# Patient Record
Sex: MALE | Race: WHITE | Employment: FULL TIME | URBAN - METROPOLITAN AREA
[De-identification: names, ages, dates, MRNs, and addresses within clinical notes are randomized per-mention and may not be internally consistent; named-entity substitution may affect disease eponyms.]

---

## 2018-11-08 ENCOUNTER — OFFICE VISIT (OUTPATIENT)
Dept: INTERNAL MEDICINE CLINIC | Facility: CLINIC | Age: 38
End: 2018-11-08
Payer: COMMERCIAL

## 2018-11-08 VITALS
SYSTOLIC BLOOD PRESSURE: 124 MMHG | BODY MASS INDEX: 25.28 KG/M2 | WEIGHT: 190.75 LBS | OXYGEN SATURATION: 99 % | TEMPERATURE: 98 F | DIASTOLIC BLOOD PRESSURE: 78 MMHG | HEART RATE: 76 BPM | HEIGHT: 72.75 IN | RESPIRATION RATE: 16 BRPM

## 2018-11-08 DIAGNOSIS — Z00.00 ROUTINE GENERAL MEDICAL EXAMINATION AT A HEALTH CARE FACILITY: Primary | ICD-10-CM

## 2018-11-08 PROCEDURE — 99385 PREV VISIT NEW AGE 18-39: CPT | Performed by: INTERNAL MEDICINE

## 2018-11-08 NOTE — PROGRESS NOTES
Ramon Villegas  1980 is a 43year old male. Patient presents with:  Establish Care      HPI:    see below     No current outpatient medications on file. History reviewed. No pertinent past medical history.    Social History:  Social History frequency , urinary incontinence/no history of kidney disease or genital abnormalities. no Dysuria. Nocturia None. Musculoskeletal:   Patient denies arthritis , back pain, muscle weakness . Joint pain none. Joint stiffness none.    Peripheral Vascular: Pulses: present. Tremors: no.   Varicose veins: absent . MUSCULOSKELETAL:   Cervical spines: normal.   L-S spines: normal.   Lower extremity joints: normal.   Upper extremity joints: normal.   NEUROLOGICAL:   Babinski: negative. All other reflexes are

## 2019-08-08 ENCOUNTER — OFFICE VISIT (OUTPATIENT)
Dept: INTERNAL MEDICINE CLINIC | Facility: CLINIC | Age: 39
End: 2019-08-08
Payer: COMMERCIAL

## 2019-08-08 VITALS
SYSTOLIC BLOOD PRESSURE: 108 MMHG | WEIGHT: 199.5 LBS | DIASTOLIC BLOOD PRESSURE: 66 MMHG | RESPIRATION RATE: 16 BRPM | HEIGHT: 72.5 IN | HEART RATE: 80 BPM | BODY MASS INDEX: 26.73 KG/M2 | TEMPERATURE: 98 F

## 2019-08-08 DIAGNOSIS — K42.9 UMBILICAL HERNIA WITHOUT OBSTRUCTION AND WITHOUT GANGRENE: Primary | ICD-10-CM

## 2019-08-08 PROCEDURE — 99213 OFFICE O/P EST LOW 20 MIN: CPT | Performed by: INTERNAL MEDICINE

## 2019-08-08 NOTE — PATIENT INSTRUCTIONS
- Follow up with General Surgery in their clinic for evaluation of your hernia. Dr. Valentín Linda is here in this office on Tuesdays, and Dr. Dameon Chopra is here on Tuesdays. They are in [facility] and [place] the rest of the week.   Scheduling phone number is [phone]

## 2019-08-08 NOTE — PROGRESS NOTES
Siddhartha Chung is a 35year old male. HPI:   Patient presents with:  Lump: Pt c/o lump to his belly button area. Noticed a few months ago. No pain. Patient presents with a bump on umbilical area. There for a few months. Painless.   No change in b affect  ASSESSMENT AND PLAN:   1. Umbilical hernia without obstruction and without gangrene  Reducible hernia in umbilical area. Present for past several months. New problem. No constitutional symptoms or signs/symptoms of bowel obstruction.   Patient re

## 2019-08-11 PROBLEM — K42.9 UMBILICAL HERNIA WITHOUT OBSTRUCTION AND WITHOUT GANGRENE: Status: ACTIVE | Noted: 2019-08-11

## 2019-09-03 ENCOUNTER — OFFICE VISIT (OUTPATIENT)
Dept: SURGERY | Facility: CLINIC | Age: 39
End: 2019-09-03
Payer: COMMERCIAL

## 2019-09-03 VITALS
SYSTOLIC BLOOD PRESSURE: 125 MMHG | TEMPERATURE: 99 F | HEIGHT: 72.5 IN | BODY MASS INDEX: 26.66 KG/M2 | HEART RATE: 80 BPM | WEIGHT: 199 LBS | DIASTOLIC BLOOD PRESSURE: 84 MMHG

## 2019-09-03 DIAGNOSIS — R10.31 RIGHT GROIN PAIN: ICD-10-CM

## 2019-09-03 DIAGNOSIS — K42.9 UMBILICAL HERNIA WITHOUT OBSTRUCTION AND WITHOUT GANGRENE: Primary | ICD-10-CM

## 2019-09-03 PROCEDURE — 99243 OFF/OP CNSLTJ NEW/EST LOW 30: CPT | Performed by: SURGERY

## 2019-09-03 NOTE — H&P
New Patient Visit Note       Active Problems      1. Umbilical hernia without obstruction and without gangrene    2. Right groin pain        Chief Complaint   Patient presents with:  Hernia: NP, ref by Dr. Karthik Funez.  hernia consult, onset a couple mo diaphoresis, fatigue, fever and unexpected weight change. HENT: Negative for hearing loss, nosebleeds, sore throat and trouble swallowing. Respiratory: Negative for apnea, cough, shortness of breath and wheezing.     Cardiovascular: Negative for chest questions answered. No orders of the defined types were placed in this encounter. Imaging & Referrals   CT ABDOMEN PELVIS IV AND ORAL CONTRAST (ER)    Follow Up  No follow-ups on file.     Shelby Angeles MD

## 2019-09-04 ENCOUNTER — TELEPHONE (OUTPATIENT)
Dept: SURGERY | Facility: CLINIC | Age: 39
End: 2019-09-04

## 2019-09-04 NOTE — TELEPHONE ENCOUNTER
Left message that CT abd/pelvis authorizied by health plan. [employer] [facility] #[id number] form 9/4 to 10/3.

## 2019-09-05 ENCOUNTER — HOSPITAL ENCOUNTER (OUTPATIENT)
Dept: CT IMAGING | Age: 39
Discharge: HOME | End: 2019-09-05
Attending: SURGERY
Payer: COMMERCIAL

## 2019-09-05 DIAGNOSIS — R10.31 RIGHT GROIN PAIN: ICD-10-CM

## 2019-09-05 LAB — CREAT BLD-MCNC: 0.8 MG/DL (ref 0.7–1.3)

## 2019-09-05 PROCEDURE — 74177 CT ABD & PELVIS W/CONTRAST: CPT | Performed by: SURGERY

## 2019-09-05 PROCEDURE — 82565 ASSAY OF CREATININE: CPT

## 2019-09-10 ENCOUNTER — OFFICE VISIT (OUTPATIENT)
Dept: SURGERY | Facility: CLINIC | Age: 39
End: 2019-09-10
Payer: COMMERCIAL

## 2019-09-10 VITALS
DIASTOLIC BLOOD PRESSURE: 80 MMHG | TEMPERATURE: 97 F | SYSTOLIC BLOOD PRESSURE: 130 MMHG | WEIGHT: 199 LBS | BODY MASS INDEX: 27 KG/M2 | HEART RATE: 58 BPM

## 2019-09-10 DIAGNOSIS — K42.9 UMBILICAL HERNIA WITHOUT OBSTRUCTION AND WITHOUT GANGRENE: Primary | ICD-10-CM

## 2019-09-10 PROCEDURE — 99243 OFF/OP CNSLTJ NEW/EST LOW 30: CPT | Performed by: SURGERY

## 2019-09-10 NOTE — PROGRESS NOTES
Follow Up Visit Note       Active Problems      1. Umbilical hernia without obstruction and without gangrene          Chief Complaint   Patient presents with:   Follow - Up: CT done 9/5        History of Present Illness    Here for follow-up after his CT sc Known Problems Daughter    • No Known Problems Son    • No Known Problems Sister    • No Known Problems Brother      Social History    Socioeconomic History      Marital status:       Spouse name: Not on file      Number of children: Not on file respiratory distress. He has no wheezes. Abdominal: Soft. Bowel sounds are normal. He exhibits no distension. There is no tenderness. There is no rebound. 2.5 x 2 cm palpable umbilical hernia. Skin: He is not diaphoretic.    Nursing note and vitals

## 2019-09-27 ENCOUNTER — LAB REQUISITION (OUTPATIENT)
Dept: LAB | Facility: HOSPITAL | Age: 39
End: 2019-09-27
Payer: COMMERCIAL

## 2019-09-27 DIAGNOSIS — K42.9 UMBILICAL HERNIA WITHOUT OBSTRUCTION OR GANGRENE: ICD-10-CM

## 2019-09-27 PROCEDURE — 88302 TISSUE EXAM BY PATHOLOGIST: CPT | Performed by: SURGERY

## 2019-09-30 ENCOUNTER — TELEPHONE (OUTPATIENT)
Dept: SURGERY | Facility: CLINIC | Age: 39
End: 2019-09-30

## 2019-09-30 RX ORDER — HYDROCODONE BITARTRATE AND ACETAMINOPHEN 5; 325 MG/1; MG/1
1 TABLET ORAL EVERY 6 HOURS PRN
Qty: 20 TABLET | Refills: 0 | Status: SHIPPED | OUTPATIENT
Start: 2019-09-30 | End: 2020-11-24 | Stop reason: ALTCHOICE

## 2019-09-30 NOTE — TELEPHONE ENCOUNTER
Pt called stating needs more norco. Pt is completely out of norco at home. He received 12 upon D/C. Pt is C/O 8/10 pain. Has tried alternating tylenol and ibuprofen. He had umbilical hernia surgery with RRP on 9/27/19. Has a PO appt on 10/4/19.      MARSHALL gamez

## 2019-09-30 NOTE — TELEPHONE ENCOUNTER
Pt. Wife came in to  a refill for the hydrocodone. I confirmed with ID, and gave the script to Pt.'s wife Antonieta Brown.

## 2019-10-04 ENCOUNTER — OFFICE VISIT (OUTPATIENT)
Dept: SURGERY | Facility: CLINIC | Age: 39
End: 2019-10-04

## 2019-10-04 VITALS
HEART RATE: 68 BPM | TEMPERATURE: 98 F | DIASTOLIC BLOOD PRESSURE: 72 MMHG | BODY MASS INDEX: 26.95 KG/M2 | HEIGHT: 72 IN | SYSTOLIC BLOOD PRESSURE: 115 MMHG | WEIGHT: 199 LBS

## 2019-10-04 DIAGNOSIS — K42.9 UMBILICAL HERNIA WITHOUT OBSTRUCTION AND WITHOUT GANGRENE: Primary | ICD-10-CM

## 2019-10-04 PROCEDURE — 99024 POSTOP FOLLOW-UP VISIT: CPT | Performed by: PHYSICIAN ASSISTANT

## 2019-10-04 NOTE — PROGRESS NOTES
Post Operative Visit Note       Active Problems  1.  Umbilical hernia without obstruction and without gangrene         Chief Complaint   Patient presents with:  Hernia: PO 0/75 RRP - Umbilical hernia , no loss of appetite, normal bowel mvmnts, c/o of some p use: Yes        Comment: occ      Drug use: No      Sexual activity: Yes    Other Topics      Concerns:       Current Outpatient Medications:   •  HYDROcodone-acetaminophen (NORCO) 5-325 MG Oral Tab, Take 1 tablet by mouth every 6 (six) hours as needed for Steri-Strips in place. No redness, erythema or drainage. No evidence of hernia. Neurological: He is alert and oriented to person, place, and time. Skin: Skin is warm and dry. Psychiatric: He has a normal mood and affect.  His behavior is normal.   N

## 2019-11-07 ENCOUNTER — TELEPHONE (OUTPATIENT)
Dept: SURGERY | Facility: CLINIC | Age: 39
End: 2019-11-07

## 2020-11-16 ENCOUNTER — TELEPHONE (OUTPATIENT)
Dept: INTERNAL MEDICINE CLINIC | Facility: CLINIC | Age: 40
End: 2020-11-16

## 2020-11-16 DIAGNOSIS — Z00.00 ROUTINE GENERAL MEDICAL EXAMINATION AT A HEALTH CARE FACILITY: Primary | ICD-10-CM

## 2020-11-16 NOTE — TELEPHONE ENCOUNTER
Patient requests annual orders for lab draw to include covid antibody test, prior to appt if appropriate    Call pt spouse, Aisha Beckett, to advise     Future Appointments   Date Time Provider Alejandra Hartman   11/24/2020 10:30 AM France Felix MD [facility]

## 2020-11-16 NOTE — TELEPHONE ENCOUNTER
Labs pending for cpx    Would you place covid antibodies test as well. No documentation that pt has tested + in the past, nor calling our office w symptoms.

## 2020-11-24 ENCOUNTER — LAB ENCOUNTER (OUTPATIENT)
Dept: LAB | Age: 40
End: 2020-11-24
Attending: INTERNAL MEDICINE
Payer: COMMERCIAL

## 2020-11-24 ENCOUNTER — OFFICE VISIT (OUTPATIENT)
Dept: INTERNAL MEDICINE CLINIC | Facility: CLINIC | Age: 40
End: 2020-11-24
Payer: COMMERCIAL

## 2020-11-24 VITALS
HEIGHT: 72.5 IN | SYSTOLIC BLOOD PRESSURE: 118 MMHG | HEART RATE: 81 BPM | DIASTOLIC BLOOD PRESSURE: 82 MMHG | RESPIRATION RATE: 16 BRPM | OXYGEN SATURATION: 99 % | BODY MASS INDEX: 27.92 KG/M2 | WEIGHT: 208.38 LBS | TEMPERATURE: 98 F

## 2020-11-24 DIAGNOSIS — M77.11 LATERAL EPICONDYLITIS OF RIGHT ELBOW: ICD-10-CM

## 2020-11-24 DIAGNOSIS — Z00.00 ROUTINE GENERAL MEDICAL EXAMINATION AT A HEALTH CARE FACILITY: Primary | ICD-10-CM

## 2020-11-24 DIAGNOSIS — M65.331 TRIGGER MIDDLE FINGER OF RIGHT HAND: ICD-10-CM

## 2020-11-24 DIAGNOSIS — Z00.00 ROUTINE GENERAL MEDICAL EXAMINATION AT A HEALTH CARE FACILITY: ICD-10-CM

## 2020-11-24 PROBLEM — K42.9 UMBILICAL HERNIA WITHOUT OBSTRUCTION AND WITHOUT GANGRENE: Status: RESOLVED | Noted: 2019-08-11 | Resolved: 2020-11-24

## 2020-11-24 PROCEDURE — 80053 COMPREHEN METABOLIC PANEL: CPT

## 2020-11-24 PROCEDURE — 99072 ADDL SUPL MATRL&STAF TM PHE: CPT | Performed by: INTERNAL MEDICINE

## 2020-11-24 PROCEDURE — 84443 ASSAY THYROID STIM HORMONE: CPT

## 2020-11-24 PROCEDURE — 3079F DIAST BP 80-89 MM HG: CPT | Performed by: INTERNAL MEDICINE

## 2020-11-24 PROCEDURE — 3008F BODY MASS INDEX DOCD: CPT | Performed by: INTERNAL MEDICINE

## 2020-11-24 PROCEDURE — 85025 COMPLETE CBC W/AUTO DIFF WBC: CPT

## 2020-11-24 PROCEDURE — 82306 VITAMIN D 25 HYDROXY: CPT

## 2020-11-24 PROCEDURE — 3074F SYST BP LT 130 MM HG: CPT | Performed by: INTERNAL MEDICINE

## 2020-11-24 PROCEDURE — 80061 LIPID PANEL: CPT

## 2020-11-24 PROCEDURE — 83036 HEMOGLOBIN GLYCOSYLATED A1C: CPT

## 2020-11-24 PROCEDURE — 36415 COLL VENOUS BLD VENIPUNCTURE: CPT

## 2020-11-24 PROCEDURE — 99396 PREV VISIT EST AGE 40-64: CPT | Performed by: INTERNAL MEDICINE

## 2020-11-24 RX ORDER — MELOXICAM 7.5 MG/1
7.5 TABLET ORAL DAILY
Qty: 30 TABLET | Refills: 1 | Status: SHIPPED | OUTPATIENT
Start: 2020-11-24 | End: 2021-06-29

## 2020-11-24 NOTE — PROGRESS NOTES
Alex Cowan  1980 is a 38year old male. Patient presents with:  Physical      HPI:    see below   No current outpatient medications on file.       Past Medical History:   Diagnosis Date   • Umbilical hernia without obstruction and without ga Patient denies blood in urine, burning on urination, difficulty urinating, dysuria, urinary frequency , urinary incontinence/no history of kidney disease or genital abnormalities. no Dysuria. Nocturia None.    Musculoskeletal:   Patient denies arthritis , b Tenderness: absent . GENITOURINARY - MALE:   External genitals: normal scrotum,testis and penis. CLARITA: normal .   Penis: unremarkable, no penile discharge, no penile lesions. Testicles: unremarkable, normal-size, without masses, non tender.    EXTREMIT LA-tuhr-alyssa [id number]-EPK-duh-LY-tis   What causes lateral epicondylitis? The condition most often occurs because of overuse.  This can be from any activity that repeatedly puts stress on the forearm extensor muscles or tendons and wrist. For instance, playing · Having surgery. This may be an option if other treatments fail to relieve symptoms. In many cases, the surgeon removes the damaged tissue.   Possible complications of lateral epicondylitis  If the tendons involved don’t heal properly, symptoms may return Your healthcare provider may give you shots of an anti-inflammatory medicine such as cortisone. This helps ease swelling. You may have more pain at first. But your elbow should feel better in a few days.   If surgery is needed  If your symptoms go on for a When a tendon is inflamed, it causes the lining of the tendon sheath to swell and thicken. Or the tendon itself may thicken. Then the sheath pinches the tendon. The tendon can then no longer slide easily inside the sheath.  When you straighten your finger, If nonsurgical treatments don’t ease your symptoms, you may need surgery. A tendon is a cordlike fiber that attaches muscle to bone and allows joints to bend. The tendon is surrounded by a protective cover called a sheath.  During surgery, the sheath in you

## 2020-11-24 NOTE — PATIENT INSTRUCTIONS
Understanding Lateral Epicondylitis     Tendons are strong bands of tissue that connect muscles to bones. Lateral epicondylitis affects the tendons that connect muscles in the forearm to the lateral epicondyle.  This is the bony knob on the outer side of · Taking pain medicines. Taking prescription or over-the-counter pain medicines may help reduce pain and swelling.    · Wearing a brace. This helps reduce strain on the muscles and tendons in the forearm, which may relieve symptoms.  It's very important to Wear a tennis elbow splint to let the inflamed tendon rest and heal. It must be worn correctly. It should be placed down the arm past the painful area of the elbow.  It can make symptoms worse if it's directly over the inflamed tendon. Take stress off the t Repeated use of a tool with strong gripping, such as a drill or wrench, can irritate and inflame the tendons and the synovium. It is also more common in certain medical conditions, such as rheumatoid arthritis, gout, and diabetes.  But often the cause of tr Trigger finger occurs when the tissue inside your finger or thumb becomes inflamed. Mild cases can be treated without surgery. If the problem is severe, surgery may be needed. Your healthcare provider Praveena Manners with you about your options.   Nonsurgical elen

## 2020-12-08 ENCOUNTER — TELEPHONE (OUTPATIENT)
Dept: INTERNAL MEDICINE CLINIC | Facility: CLINIC | Age: 40
End: 2020-12-08

## 2020-12-08 DIAGNOSIS — E78.00 ELEVATED LDL CHOLESTEROL LEVEL: ICD-10-CM

## 2020-12-08 DIAGNOSIS — R79.89 ABNORMAL THYROID BLOOD TEST: Primary | ICD-10-CM

## 2020-12-08 NOTE — TELEPHONE ENCOUNTER
----- Message from Mary Villalobos MD sent at 11/24/2020  4:07 PM CST -----  Reviewed results   Vitamin D is on the lower side patient to take 2000 units of vitamin D daily.   TSH is borderline high will repeat that in 3 months along with the free T4 TSH  DL

## 2021-06-29 ENCOUNTER — OFFICE VISIT (OUTPATIENT)
Dept: INTERNAL MEDICINE CLINIC | Facility: CLINIC | Age: 41
End: 2021-06-29
Payer: COMMERCIAL

## 2021-06-29 VITALS
DIASTOLIC BLOOD PRESSURE: 72 MMHG | TEMPERATURE: 98 F | RESPIRATION RATE: 16 BRPM | WEIGHT: 191.38 LBS | HEIGHT: 72.5 IN | BODY MASS INDEX: 25.64 KG/M2 | OXYGEN SATURATION: 100 % | HEART RATE: 62 BPM | SYSTOLIC BLOOD PRESSURE: 102 MMHG

## 2021-06-29 DIAGNOSIS — H61.23 CERUMEN DEBRIS ON TYMPANIC MEMBRANE OF BOTH EARS: Primary | ICD-10-CM

## 2021-06-29 PROCEDURE — 99213 OFFICE O/P EST LOW 20 MIN: CPT | Performed by: INTERNAL MEDICINE

## 2021-06-29 PROCEDURE — 3008F BODY MASS INDEX DOCD: CPT | Performed by: INTERNAL MEDICINE

## 2021-06-29 PROCEDURE — 3078F DIAST BP <80 MM HG: CPT | Performed by: INTERNAL MEDICINE

## 2021-06-29 PROCEDURE — 3074F SYST BP LT 130 MM HG: CPT | Performed by: INTERNAL MEDICINE

## 2021-06-29 RX ORDER — MELOXICAM 7.5 MG/1
7.5 TABLET ORAL DAILY
Qty: 30 TABLET | Refills: 1 | Status: SHIPPED | OUTPATIENT
Start: 2021-06-29

## 2021-06-29 NOTE — PROGRESS NOTES
Maria Isabel Alfaro  1980 is a 38year old male. Patient presents with:  Ear Problem      HPI:   Ear/General:    Presents with c/o Cerumen hearing loss, bilateral , Onset: gradual, Severity: moderate, Nature: dull.      Current Outpatient Medicatio

## 2023-10-24 ENCOUNTER — TELEPHONE (OUTPATIENT)
Dept: INTERNAL MEDICINE CLINIC | Facility: CLINIC | Age: 43
End: 2023-10-24

## 2023-10-24 DIAGNOSIS — Z00.00 LABORATORY EXAMINATION ORDERED AS PART OF A COMPLETE PHYSICAL EXAMINATION: Primary | ICD-10-CM

## 2023-10-24 NOTE — TELEPHONE ENCOUNTER
VM - pt last seen in office 6/29/21. Please review pended lab orders for accuracy. Advise when signed.  Ty!

## 2023-11-04 ENCOUNTER — LAB ENCOUNTER (OUTPATIENT)
Dept: LAB | Age: 43
End: 2023-11-04
Attending: INTERNAL MEDICINE
Payer: COMMERCIAL

## 2023-11-04 DIAGNOSIS — Z00.00 LABORATORY EXAMINATION ORDERED AS PART OF A COMPLETE PHYSICAL EXAMINATION: ICD-10-CM

## 2023-11-04 LAB
ALBUMIN SERPL-MCNC: 4.1 G/DL (ref 3.4–5)
ALBUMIN/GLOB SERPL: 1.3 {RATIO} (ref 1–2)
ALP LIVER SERPL-CCNC: 58 U/L
ALT SERPL-CCNC: 56 U/L
ANION GAP SERPL CALC-SCNC: 4 MMOL/L (ref 0–18)
AST SERPL-CCNC: 27 U/L (ref 15–37)
BASOPHILS # BLD AUTO: 0.04 X10(3) UL (ref 0–0.2)
BASOPHILS NFR BLD AUTO: 0.6 %
BILIRUB SERPL-MCNC: 0.7 MG/DL (ref 0.1–2)
BILIRUB UR QL STRIP.AUTO: NEGATIVE
BUN BLD-MCNC: 16 MG/DL (ref 9–23)
CALCIUM BLD-MCNC: 8.9 MG/DL (ref 8.5–10.1)
CHLORIDE SERPL-SCNC: 108 MMOL/L (ref 98–112)
CHOLEST SERPL-MCNC: 169 MG/DL (ref ?–200)
CLARITY UR REFRACT.AUTO: CLEAR
CO2 SERPL-SCNC: 27 MMOL/L (ref 21–32)
CREAT BLD-MCNC: 0.98 MG/DL
EGFRCR SERPLBLD CKD-EPI 2021: 98 ML/MIN/1.73M2 (ref 60–?)
EOSINOPHIL # BLD AUTO: 0.13 X10(3) UL (ref 0–0.7)
EOSINOPHIL NFR BLD AUTO: 1.9 %
ERYTHROCYTE [DISTWIDTH] IN BLOOD BY AUTOMATED COUNT: 12.4 %
EST. AVERAGE GLUCOSE BLD GHB EST-MCNC: 108 MG/DL (ref 68–126)
FASTING PATIENT LIPID ANSWER: YES
FASTING STATUS PATIENT QL REPORTED: YES
GLOBULIN PLAS-MCNC: 3.2 G/DL (ref 2.8–4.4)
GLUCOSE BLD-MCNC: 85 MG/DL (ref 70–99)
GLUCOSE UR STRIP.AUTO-MCNC: NORMAL MG/DL
HBA1C MFR BLD: 5.4 % (ref ?–5.7)
HCT VFR BLD AUTO: 43.8 %
HDLC SERPL-MCNC: 48 MG/DL (ref 40–59)
HGB BLD-MCNC: 14.3 G/DL
IMM GRANULOCYTES # BLD AUTO: 0.03 X10(3) UL (ref 0–1)
IMM GRANULOCYTES NFR BLD: 0.4 %
KETONES UR STRIP.AUTO-MCNC: NEGATIVE MG/DL
LDLC SERPL CALC-MCNC: 96 MG/DL (ref ?–100)
LEUKOCYTE ESTERASE UR QL STRIP.AUTO: NEGATIVE
LYMPHOCYTES # BLD AUTO: 3.05 X10(3) UL (ref 1–4)
LYMPHOCYTES NFR BLD AUTO: 45.5 %
MCH RBC QN AUTO: 29.4 PG (ref 26–34)
MCHC RBC AUTO-ENTMCNC: 32.6 G/DL (ref 31–37)
MCV RBC AUTO: 89.9 FL
MONOCYTES # BLD AUTO: 0.65 X10(3) UL (ref 0.1–1)
MONOCYTES NFR BLD AUTO: 9.7 %
NEUTROPHILS # BLD AUTO: 2.81 X10 (3) UL (ref 1.5–7.7)
NEUTROPHILS # BLD AUTO: 2.81 X10(3) UL (ref 1.5–7.7)
NEUTROPHILS NFR BLD AUTO: 41.9 %
NITRITE UR QL STRIP.AUTO: NEGATIVE
NONHDLC SERPL-MCNC: 121 MG/DL (ref ?–130)
OSMOLALITY SERPL CALC.SUM OF ELEC: 288 MOSM/KG (ref 275–295)
PH UR STRIP.AUTO: 7 [PH] (ref 5–8)
PLATELET # BLD AUTO: 223 10(3)UL (ref 150–450)
POTASSIUM SERPL-SCNC: 3.8 MMOL/L (ref 3.5–5.1)
PROT SERPL-MCNC: 7.3 G/DL (ref 6.4–8.2)
PROT UR STRIP.AUTO-MCNC: NEGATIVE MG/DL
RBC # BLD AUTO: 4.87 X10(6)UL
RBC UR QL AUTO: NEGATIVE
SODIUM SERPL-SCNC: 139 MMOL/L (ref 136–145)
SP GR UR STRIP.AUTO: 1.01 (ref 1–1.03)
TESTOST SERPL-MCNC: 570.42 NG/DL
TRIGL SERPL-MCNC: 143 MG/DL (ref 30–149)
TSI SER-ACNC: 2.47 MIU/ML (ref 0.36–3.74)
UROBILINOGEN UR STRIP.AUTO-MCNC: NORMAL MG/DL
VIT D+METAB SERPL-MCNC: 18.8 NG/ML (ref 30–100)
VLDLC SERPL CALC-MCNC: 24 MG/DL (ref 0–30)
WBC # BLD AUTO: 6.7 X10(3) UL (ref 4–11)

## 2023-11-04 PROCEDURE — 84443 ASSAY THYROID STIM HORMONE: CPT

## 2023-11-04 PROCEDURE — 81003 URINALYSIS AUTO W/O SCOPE: CPT

## 2023-11-04 PROCEDURE — 80053 COMPREHEN METABOLIC PANEL: CPT

## 2023-11-04 PROCEDURE — 84403 ASSAY OF TOTAL TESTOSTERONE: CPT

## 2023-11-04 PROCEDURE — 80061 LIPID PANEL: CPT

## 2023-11-04 PROCEDURE — 83036 HEMOGLOBIN GLYCOSYLATED A1C: CPT

## 2023-11-04 PROCEDURE — 36415 COLL VENOUS BLD VENIPUNCTURE: CPT

## 2023-11-04 PROCEDURE — 82306 VITAMIN D 25 HYDROXY: CPT

## 2023-11-04 PROCEDURE — 85025 COMPLETE CBC W/AUTO DIFF WBC: CPT

## 2023-11-27 ENCOUNTER — OFFICE VISIT (OUTPATIENT)
Dept: INTERNAL MEDICINE CLINIC | Facility: CLINIC | Age: 43
End: 2023-11-27
Payer: COMMERCIAL

## 2023-11-27 VITALS
WEIGHT: 209 LBS | HEART RATE: 84 BPM | HEIGHT: 72.5 IN | SYSTOLIC BLOOD PRESSURE: 122 MMHG | BODY MASS INDEX: 28 KG/M2 | TEMPERATURE: 98 F | RESPIRATION RATE: 14 BRPM | DIASTOLIC BLOOD PRESSURE: 84 MMHG | OXYGEN SATURATION: 96 %

## 2023-11-27 DIAGNOSIS — Z00.00 ROUTINE GENERAL MEDICAL EXAMINATION AT A HEALTH CARE FACILITY: ICD-10-CM

## 2023-11-27 DIAGNOSIS — E55.9 VITAMIN D DEFICIENCY: ICD-10-CM

## 2023-11-27 DIAGNOSIS — M65.331 TRIGGER MIDDLE FINGER OF RIGHT HAND: Primary | ICD-10-CM

## 2023-11-27 RX ORDER — CHOLECALCIFEROL (VITAMIN D3) 50 MCG
1 TABLET ORAL DAILY
Qty: 90 TABLET | Refills: 3 | Status: SHIPPED | OUTPATIENT
Start: 2023-11-27 | End: 2024-02-25

## 2023-12-04 ENCOUNTER — TELEPHONE (OUTPATIENT)
Dept: ORTHOPEDICS CLINIC | Facility: CLINIC | Age: 43
End: 2023-12-04

## 2023-12-04 NOTE — TELEPHONE ENCOUNTER
Pt coming in for Trigger middle finger of right hand. Please advise if xrays are needed. Thanks!      Future Appointments   Date Time Provider Alejandra Hartman   12/7/2023  8:20 AM MARSHALL Fletcher [facility]

## 2024-01-11 ENCOUNTER — OFFICE VISIT (OUTPATIENT)
Dept: ORTHOPEDICS CLINIC | Facility: CLINIC | Age: 44
End: 2024-01-11
Payer: COMMERCIAL

## 2024-01-11 VITALS — BODY MASS INDEX: 28 KG/M2 | HEIGHT: 72.5 IN | WEIGHT: 209 LBS

## 2024-01-11 DIAGNOSIS — M65.331 TRIGGER MIDDLE FINGER OF RIGHT HAND: Primary | ICD-10-CM

## 2024-01-11 RX ORDER — BETAMETHASONE SODIUM PHOSPHATE AND BETAMETHASONE ACETATE 3; 3 MG/ML; MG/ML
6 INJECTION, SUSPENSION INTRA-ARTICULAR; INTRALESIONAL; INTRAMUSCULAR; SOFT TISSUE ONCE
Status: COMPLETED | OUTPATIENT
Start: 2024-01-11 | End: 2024-01-11

## 2024-01-11 RX ADMIN — BETAMETHASONE SODIUM PHOSPHATE AND BETAMETHASONE ACETATE 6 MG: 3; 3 INJECTION, SUSPENSION INTRA-ARTICULAR; INTRALESIONAL; INTRAMUSCULAR; SOFT TISSUE at 15:22:00

## 2024-01-11 NOTE — H&P
Clinic Note [facility]     Assessment/Plan:  43 year old with triggering of right middle finger.  I discussed with the patient various treatment options and their success rate/risks.  We using a shared decision-making process decided to proceed with a corticosteroid injection.   Patient will follow up in 6 weeks.  If patient symptoms are completely resolved, patient can cancel the appointment and follow-up on an as-needed basis.    Procedure:  Injection: Written consent was obtained.  Skin was prepped with ChloraPrep.  1 mL of 6 mg of betamethasone and 1 mL of 1% lidocaine was injected into the right middle finger.  Patient tolerated the procedure well.  No complications were encountered.  Band-Aid was applied.      Physical Exam:    Ht 6' 0.5\" (1.842 m)   Wt 209 lb (94.8 kg)   BMI 27.96 kg/m²     Constitutional: NAD. AOx3. Well-developed and Well-nourished.   Psychiatric: Normal mood/ affect/ behavior. Judgment and thought content normal.     Right upper Extremity:   Inspection: Skin Intact. No skin lesions. No gross deformity.   Palpation:  (+) TTP over A1 pulley   Motion: Elbow: normal bilateral symmetric ext/flex  Wrist: normal bilateral symmetric ext/flex/sup/pro  Finger: full composite fist,    Special Tests: (+) Active triggering. (-) PIPJ contracture. Normally sensate digit. Normally perfused digit.       CC: Triggering of right middle finger    HPI: 43 year old  male presents with triggering of right middle. It started 2 years ago. It has failed to improve/resolve. Pain level is moderate. Pain is described as locking. Patient has tried nothing.     (+) pain at A1 Pulley  (+) active triggering    Occupation:  for the [employer].    Past Medical History:   Diagnosis Date    Umbilical hernia without obstruction and without gangrene 8/11/2019     Past Surgical History:   Procedure Laterality Date    HERNIA SURGERY      OTHER SURGICAL HISTORY      plate in right tibia     Current Outpatient  Medications   Medication Sig Dispense Refill    Cholecalciferol (VITAMIN D) 50 MCG (2000 UT) Oral Tab Take 1 tablet by mouth daily for 90 doses. 90 tablet 3     No Known Allergies  Family History   Problem Relation Age of Onset    No Known Problems Mother     No Known Problems Daughter     No Known Problems Son     No Known Problems Sister     No Known Problems Brother      Social History     Occupational History    Not on file   Tobacco Use    Smoking status: Former     Types: Cigarettes    Smokeless tobacco: Never   Vaping Use    Vaping Use: Never used   Substance and Sexual Activity    Alcohol use: Yes     Comment: occ    Drug use: No    Sexual activity: Yes        Review of Systems (negative unless bolded):  General: fevers, chills, fatigue  CV:  chest pain, palpitations, leg swelling  Msk: bodyaches, neck pain, neck stiffness  Skin: rashes, open wounds, nonhealing ulcers  Hem: bleeds easily, bruise easily, immunocompromised  Neuro: dizziness, light headedness, headaches  Psych: anxious, depressed, anger issues      Rosana Kim PA-C  Hand, Wrist, & Elbow Surgery  Physician Assistant to Dr. Jey Enriquez  [facility]  [address] [place] [address], [address], [email or web address]  [email or web address]  t: [phone]  f: [phone]

## 2024-02-22 ENCOUNTER — OFFICE VISIT (OUTPATIENT)
Dept: ORTHOPEDICS CLINIC | Facility: CLINIC | Age: 44
End: 2024-02-22
Payer: COMMERCIAL

## 2024-02-22 VITALS — WEIGHT: 209 LBS | BODY MASS INDEX: 28 KG/M2 | HEIGHT: 72.5 IN

## 2024-02-22 DIAGNOSIS — M65.331 TRIGGER MIDDLE FINGER OF RIGHT HAND: Primary | ICD-10-CM

## 2024-02-22 PROCEDURE — 20550 NJX 1 TENDON SHEATH/LIGAMENT: CPT | Performed by: PHYSICIAN ASSISTANT

## 2024-02-22 PROCEDURE — 3008F BODY MASS INDEX DOCD: CPT | Performed by: PHYSICIAN ASSISTANT

## 2024-02-22 PROCEDURE — 99213 OFFICE O/P EST LOW 20 MIN: CPT | Performed by: PHYSICIAN ASSISTANT

## 2024-02-22 RX ORDER — BETAMETHASONE SODIUM PHOSPHATE AND BETAMETHASONE ACETATE 3; 3 MG/ML; MG/ML
6 INJECTION, SUSPENSION INTRA-ARTICULAR; INTRALESIONAL; INTRAMUSCULAR; SOFT TISSUE ONCE
Status: COMPLETED | OUTPATIENT
Start: 2024-02-22 | End: 2024-02-22

## 2024-02-22 RX ADMIN — BETAMETHASONE SODIUM PHOSPHATE AND BETAMETHASONE ACETATE 6 MG: 3; 3 INJECTION, SUSPENSION INTRA-ARTICULAR; INTRALESIONAL; INTRAMUSCULAR; SOFT TISSUE at 15:18:00

## 2024-02-23 NOTE — PROGRESS NOTES
Clinic Note [facility]     Assessment/Plan:  43 year old with triggering of right middle finger.  I discussed with the patient various treatment options and their success rate/risks.  We using a shared decision-making process decided to proceed with a repeat corticosteroid injection.   [facility]  He does have very obvious trigger finger which is quite severe.  If he does not respond to repeat injection we discussed possible video visit with Dr. Enriquez and A1 pulley release to be done under local.  All his questions were answered.    Procedure:  Injection: Written consent was obtained.  Skin was prepped with ChloraPrep.  1 mL of 6 mg of betamethasone and 1 mL of 1% lidocaine was injected into the right middle finger.  Patient tolerated the procedure well.  No complications were encountered.  Band-Aid was applied.      Physical Exam:    Ht 6' 0.5\" (1.842 m)   Wt 209 lb (94.8 kg)   BMI 27.96 kg/m²     Constitutional: NAD. AOx3. Well-developed and Well-nourished.   Psychiatric: Normal mood/ affect/ behavior. Judgment and thought content normal.     Right upper Extremity:   Inspection: Skin Intact. No skin lesions. No gross deformity.   Palpation:  (+) TTP over A1 pulley   Motion: Elbow: normal bilateral symmetric ext/flex  Wrist: normal bilateral symmetric ext/flex/sup/pro  Finger: full composite fist,    Special Tests: (+) Active triggering. (-) PIPJ contracture. Normally sensate digit. Normally perfused digit.       CC: Triggering of right middle finger    HPI: 43 year old  male presents with triggering of right middle. It started 2 years ago. It has failed to improve/resolve. Pain level is moderate. Pain is described as locking. Patient has tried nothing.     (+) pain at A1 Pulley  (+) active triggering    Occupation:  for the [employer].      Interval history: Patient states he did not have any relief with the first injection.  He is here for repeat evaluation  Past  Medical History:   Diagnosis Date    Umbilical hernia without obstruction and without gangrene 8/11/2019     Past Surgical History:   Procedure Laterality Date    HERNIA SURGERY      OTHER SURGICAL HISTORY      plate in right tibia     Current Outpatient Medications   Medication Sig Dispense Refill    Cholecalciferol (VITAMIN D) 50 MCG (2000 UT) Oral Tab Take 1 tablet by mouth daily for 90 doses. 90 tablet 3     No Known Allergies  Family History   Problem Relation Age of Onset    No Known Problems Mother     No Known Problems Daughter     No Known Problems Son     No Known Problems Sister     No Known Problems Brother      Social History     Occupational History    Not on file   Tobacco Use    Smoking status: Former     Types: Cigarettes    Smokeless tobacco: Never   Vaping Use    Vaping Use: Never used   Substance and Sexual Activity    Alcohol use: Yes     Comment: occ    Drug use: No    Sexual activity: Yes        Review of Systems (negative unless bolded):  General: fevers, chills, fatigue  CV:  chest pain, palpitations, leg swelling  Msk: bodyaches, neck pain, neck stiffness  Skin: rashes, open wounds, nonhealing ulcers  Hem: bleeds easily, bruise easily, immunocompromised  Neuro: dizziness, light headedness, headaches  Psych: anxious, depressed, anger issues      Rosana Kim PA-C  Hand, Wrist, & Elbow Surgery  Physician Assistant to Dr. Jey Enriquez  [facility]  [address] [place] [address], [address], [email or web address]  [email or web address]  t: [phone]  f: [phone]

## 2024-06-06 ENCOUNTER — TELEPHONE (OUTPATIENT)
Dept: ORTHOPEDICS CLINIC | Facility: CLINIC | Age: 44
End: 2024-06-06

## 2024-06-06 NOTE — TELEPHONE ENCOUNTER
From: Rosana Carvajal  To: Sukhdeep Willson  Sent: 6/6/2024 8:18 AM CDT  Subject: trigger finger    Hi! I wanted to check in and see how the second injection went for the trigger finger? I see you are on my schedule for Monday and I unfortunately have to attend a wake that afternoon and will need to reschedule the visit with you. I apologize about that. I was also thinking if the injection didn't help I would want you to meet with the surgeon I work with to talk about moving forward with surgery instead as we will likely not recommend another injection.     Let me know how its going and we will go from there!    Rosana

## 2024-06-10 ENCOUNTER — OFFICE VISIT (OUTPATIENT)
Dept: ORTHOPEDICS CLINIC | Facility: CLINIC | Age: 44
End: 2024-06-10
Payer: COMMERCIAL

## 2024-06-10 VITALS — BODY MASS INDEX: 28 KG/M2 | WEIGHT: 209 LBS | HEIGHT: 72.5 IN

## 2024-06-10 DIAGNOSIS — M65.331 TRIGGER MIDDLE FINGER OF RIGHT HAND: Primary | ICD-10-CM

## 2024-06-10 NOTE — PROGRESS NOTES
SURGICAL BOOKING SHEET   Name: Sukhdeep Willson  MRN: [id number]   : 1980    Surgical Date:   TBD   Surgical Consent:   A1 pulley release of right middle finger   Diagnosis:      ICD-10-CM   1. Trigger middle finger of right hand  M65.331       Workers Comp: No   Procedure Codes:   Trigger Finger Release (01323)   Disposition:   Outpatient   Operative Time:   15 mins   Antibiotics:   None   Anesthesia Type:   Local Only   Clearance:    None   Equipment:   TFR  OCTR (Local/MAC): Cross Blade, Lead Hand, 4-0 Nylon Suture PS-2, Local Pre-Mix (1% lidocaine w/ epi, 0.5% marcaine, and 8.4% NaBicarb), Bacitracin, Adaptic, 4x4 gauze, 2 inch ace wrap   Assistant:   Mina Assistant: Yes, Rosana Kim PA-C   Follow Up:   10-14 days post op with Rosana   Pain Medication:   OTC   Therapy:   [facility]

## 2024-06-10 NOTE — PROGRESS NOTES
Clinic Note       Assessment/Plan:  43 year old     Triggering of the right middle finger-status post 2 corticosteroid injection with persistent symptoms.  At this point surgery was recommended.  Patient will determine optimal timing for surgery and call us back.   TFR: Patient consented to surgery after having understood the risks associated with surgery, expected outcomes, time to recovery and the possible need for therapy post-operatively. Risks include but not limited to: infection, wound complication, nerve injury resulting in temporary or permanent nerve damage, finger stiffness, persistent pain, swelling, tendon rupture, tendon bowstring, persistent/recurrent triggering, and need for additional surgery.    Physical Exam:    Ht 6' 0.5\" (1.842 m)   Wt 209 lb (94.8 kg)   BMI 27.96 kg/m²     Constitutional: NAD. AOx3. Well-developed and Well-nourished.   Psychiatric: Normal mood/ affect/ behavior. Judgment and thought content normal.     Right upper Extremity:   Inspection: Skin Intact. No skin lesions. No gross deformity.   Palpation:  (+) TTP over A1 pulley   Motion: Elbow: normal bilateral symmetric ext/flex  Wrist: normal bilateral symmetric ext/flex/sup/pro  Finger: full composite fist,    Special Tests: (+) Active triggering. (-) PIPJ contracture. Normally sensate digit. Normally perfused digit.       CC: Triggering of right middle finger    HPI: 43 year old  male presents with triggering of right middle. It started 2 years ago. It has failed to improve/resolve. Pain level is moderate. Pain is described as locking. Patient has tried nothing.     (+) pain at A1 Pulley  (+) active triggering    Interval Hx (6/10/2024): Persistent triggering is noted.  The second steroid injection helped a little bit but triggering never resolved    Occupation:  for [employer]    Past Medical History:    Umbilical hernia without obstruction and without gangrene     Past Surgical History:   Procedure Laterality Date     Hernia surgery      Other surgical history      plate in right tibia     No current outpatient medications on file.     No Known Allergies  Family History   Problem Relation Age of Onset    No Known Problems Mother     No Known Problems Daughter     No Known Problems Son     No Known Problems Sister     No Known Problems Brother      Social History     Occupational History    Not on file   Tobacco Use    Smoking status: Former     Types: Cigarettes    Smokeless tobacco: Never   Vaping Use    Vaping status: Never Used   Substance and Sexual Activity    Alcohol use: Yes     Comment: occ    Drug use: No    Sexual activity: Yes        Review of Systems (negative unless bolded):  General: fevers, chills, fatigue  CV:  chest pain, palpitations, leg swelling  Msk: bodyaches, neck pain, neck stiffness  Skin: rashes, open wounds, nonhealing ulcers  Hem: bleeds easily, bruise easily, immunocompromised  Neuro: dizziness, light headedness, headaches  Psych: anxious, depressed, anger issues    Jey Enriquez MD   Hand, Wrist, & Elbow Surgery  [email or web address]  t: [phone]  f: [phone]

## 2024-11-06 ENCOUNTER — TELEPHONE (OUTPATIENT)
Dept: INTERNAL MEDICINE CLINIC | Facility: CLINIC | Age: 44
End: 2024-11-06

## 2024-11-06 DIAGNOSIS — Z00.00 LABORATORY EXAMINATION ORDERED AS PART OF A COMPLETE PHYSICAL EXAMINATION: Primary | ICD-10-CM

## 2024-11-06 DIAGNOSIS — E55.9 VITAMIN D DEFICIENCY: ICD-10-CM

## 2024-11-06 NOTE — TELEPHONE ENCOUNTER
Pt scheduled cpx via [employer]. Requesting annual labs including testosterone check.    Future Appointments   Date Time Provider Department Center   12/26/2024  9:30 AM Sacha Rodriguez MD [facility] 8 [facility] [place]

## 2024-11-26 ENCOUNTER — LAB ENCOUNTER (OUTPATIENT)
Dept: LAB | Age: 44
End: 2024-11-26
Attending: INTERNAL MEDICINE
Payer: COMMERCIAL

## 2024-11-26 DIAGNOSIS — E55.9 VITAMIN D DEFICIENCY: ICD-10-CM

## 2024-11-26 DIAGNOSIS — Z00.00 LABORATORY EXAMINATION ORDERED AS PART OF A COMPLETE PHYSICAL EXAMINATION: ICD-10-CM

## 2024-11-26 LAB
ALBUMIN SERPL-MCNC: 5.1 G/DL (ref 3.2–4.8)
ALBUMIN/GLOB SERPL: 2 {RATIO} (ref 1–2)
ALP LIVER SERPL-CCNC: 59 U/L
ALT SERPL-CCNC: 34 U/L
ANION GAP SERPL CALC-SCNC: 7 MMOL/L (ref 0–18)
AST SERPL-CCNC: 30 U/L (ref ?–34)
BASOPHILS # BLD AUTO: 0.05 X10(3) UL (ref 0–0.2)
BASOPHILS NFR BLD AUTO: 0.8 %
BILIRUB SERPL-MCNC: 0.8 MG/DL (ref 0.3–1.2)
BILIRUB UR QL STRIP.AUTO: NEGATIVE
BUN BLD-MCNC: 13 MG/DL (ref 9–23)
CALCIUM BLD-MCNC: 9.9 MG/DL (ref 8.7–10.4)
CHLORIDE SERPL-SCNC: 105 MMOL/L (ref 98–112)
CHOLEST SERPL-MCNC: 202 MG/DL (ref ?–200)
CLARITY UR REFRACT.AUTO: CLEAR
CO2 SERPL-SCNC: 27 MMOL/L (ref 21–32)
CREAT BLD-MCNC: 0.92 MG/DL
EGFRCR SERPLBLD CKD-EPI 2021: 105 ML/MIN/1.73M2 (ref 60–?)
EOSINOPHIL # BLD AUTO: 0.05 X10(3) UL (ref 0–0.7)
EOSINOPHIL NFR BLD AUTO: 0.8 %
ERYTHROCYTE [DISTWIDTH] IN BLOOD BY AUTOMATED COUNT: 12.2 %
EST. AVERAGE GLUCOSE BLD GHB EST-MCNC: 103 MG/DL (ref 68–126)
FASTING PATIENT LIPID ANSWER: YES
FASTING STATUS PATIENT QL REPORTED: YES
GLOBULIN PLAS-MCNC: 2.6 G/DL (ref 2–3.5)
GLUCOSE BLD-MCNC: 80 MG/DL (ref 70–99)
GLUCOSE UR STRIP.AUTO-MCNC: NORMAL MG/DL
HBA1C MFR BLD: 5.2 % (ref ?–5.7)
HCT VFR BLD AUTO: 46.2 %
HDLC SERPL-MCNC: 50 MG/DL (ref 40–59)
HGB BLD-MCNC: 15.2 G/DL
IMM GRANULOCYTES # BLD AUTO: 0.02 X10(3) UL (ref 0–1)
IMM GRANULOCYTES NFR BLD: 0.3 %
KETONES UR STRIP.AUTO-MCNC: NEGATIVE MG/DL
LDLC SERPL CALC-MCNC: 135 MG/DL (ref ?–100)
LEUKOCYTE ESTERASE UR QL STRIP.AUTO: NEGATIVE
LYMPHOCYTES # BLD AUTO: 2.38 X10(3) UL (ref 1–4)
LYMPHOCYTES NFR BLD AUTO: 39.7 %
MCH RBC QN AUTO: 29.5 PG (ref 26–34)
MCHC RBC AUTO-ENTMCNC: 32.9 G/DL (ref 31–37)
MCV RBC AUTO: 89.7 FL
MONOCYTES # BLD AUTO: 0.64 X10(3) UL (ref 0.1–1)
MONOCYTES NFR BLD AUTO: 10.7 %
NEUTROPHILS # BLD AUTO: 2.86 X10 (3) UL (ref 1.5–7.7)
NEUTROPHILS # BLD AUTO: 2.86 X10(3) UL (ref 1.5–7.7)
NEUTROPHILS NFR BLD AUTO: 47.7 %
NITRITE UR QL STRIP.AUTO: NEGATIVE
NONHDLC SERPL-MCNC: 152 MG/DL (ref ?–130)
OSMOLALITY SERPL CALC.SUM OF ELEC: 287 MOSM/KG (ref 275–295)
PH UR STRIP.AUTO: 6.5 [PH] (ref 5–8)
PLATELET # BLD AUTO: 257 10(3)UL (ref 150–450)
POTASSIUM SERPL-SCNC: 4.3 MMOL/L (ref 3.5–5.1)
PROT SERPL-MCNC: 7.7 G/DL (ref 5.7–8.2)
PROT UR STRIP.AUTO-MCNC: NEGATIVE MG/DL
RBC # BLD AUTO: 5.15 X10(6)UL
RBC UR QL AUTO: NEGATIVE
SODIUM SERPL-SCNC: 139 MMOL/L (ref 136–145)
SP GR UR STRIP.AUTO: 1.01 (ref 1–1.03)
TRIGL SERPL-MCNC: 94 MG/DL (ref 30–149)
TSI SER-ACNC: 2.04 UIU/ML (ref 0.55–4.78)
UROBILINOGEN UR STRIP.AUTO-MCNC: NORMAL MG/DL
VIT D+METAB SERPL-MCNC: 34.8 NG/ML (ref 30–100)
VLDLC SERPL CALC-MCNC: 17 MG/DL (ref 0–30)
WBC # BLD AUTO: 6 X10(3) UL (ref 4–11)

## 2024-11-26 PROCEDURE — 36415 COLL VENOUS BLD VENIPUNCTURE: CPT

## 2024-11-26 PROCEDURE — 80053 COMPREHEN METABOLIC PANEL: CPT

## 2024-11-26 PROCEDURE — 80061 LIPID PANEL: CPT

## 2024-11-26 PROCEDURE — 84443 ASSAY THYROID STIM HORMONE: CPT

## 2024-11-26 PROCEDURE — 83036 HEMOGLOBIN GLYCOSYLATED A1C: CPT

## 2024-11-26 PROCEDURE — 82306 VITAMIN D 25 HYDROXY: CPT

## 2024-11-26 PROCEDURE — 81003 URINALYSIS AUTO W/O SCOPE: CPT

## 2024-11-26 PROCEDURE — 85025 COMPLETE CBC W/AUTO DIFF WBC: CPT

## 2024-12-26 ENCOUNTER — OFFICE VISIT (OUTPATIENT)
Dept: INTERNAL MEDICINE CLINIC | Facility: CLINIC | Age: 44
End: 2024-12-26
Payer: COMMERCIAL

## 2024-12-26 VITALS
SYSTOLIC BLOOD PRESSURE: 140 MMHG | HEART RATE: 94 BPM | HEIGHT: 72.5 IN | OXYGEN SATURATION: 99 % | WEIGHT: 217 LBS | BODY MASS INDEX: 29.07 KG/M2 | TEMPERATURE: 98 F | DIASTOLIC BLOOD PRESSURE: 90 MMHG | RESPIRATION RATE: 16 BRPM

## 2024-12-26 DIAGNOSIS — E78.00 HYPERCHOLESTEREMIA: ICD-10-CM

## 2024-12-26 DIAGNOSIS — Z00.00 ROUTINE GENERAL MEDICAL EXAMINATION AT A HEALTH CARE FACILITY: Primary | ICD-10-CM

## 2024-12-26 PROCEDURE — 99396 PREV VISIT EST AGE 40-64: CPT | Performed by: INTERNAL MEDICINE

## 2024-12-26 RX ORDER — PRAVASTATIN SODIUM 20 MG
20 TABLET ORAL NIGHTLY
Qty: 90 TABLET | Refills: 1 | Status: SHIPPED | OUTPATIENT
Start: 2024-12-26 | End: 2025-06-24

## 2024-12-26 RX ORDER — CHOLECALCIFEROL (VITAMIN D3) 50 MCG
2000 TABLET ORAL DAILY
COMMUNITY
Start: 2024-11-19

## 2024-12-26 NOTE — PROGRESS NOTES
Sukhdeep Willson  1980 is a 44 year old male.  Chief Complaint   Patient presents with    Physical       HPI:    see below   Current Outpatient Medications   Medication Sig Dispense Refill    cholecalciferol 50 MCG ( UT) Oral Tab Take 1 tablet (2,000 Units total) by mouth daily.      pravastatin 20 MG Oral Tab Take 1 tablet (20 mg total) by mouth nightly. 90 tablet 1      Past Medical History:    Umbilical hernia without obstruction and without gangrene      Social History:  Social History     Socioeconomic History    Marital status:    Tobacco Use    Smoking status: Former     Types: Cigarettes    Smokeless tobacco: Never   Vaping Use    Vaping status: Never Used   Substance and Sexual Activity    Alcohol use: Yes     Comment: occ    Drug use: No    Sexual activity: Yes        REVIEW OF SYSTEMS:       General/Constitutional:   General able to do usual activities, good exercise tolerance, good general state of health, no fatigue, no fever, no weakness .   HEENT/Neck:   Head no dizziness, no lightheadedness. Eyes normal vision, no redness , no drainage. Ears no earaches, no fullness, normal hearing, no tinnitus. Nose and Sinuses no recurrent colds, no stuffiness, no discharge, no hay fever, no nosebleeds, no sinus trouble. Mouth and Pharynx no sore throats, no hoarseness. Neck no lumps, no goiter, no neck stiffness or pain.   Endocrine:   Diabetes none. Thyroid disorder none.   Respiratory:   Patient denies chest pain, cough, [place]GADILLO (dyspnea on exertion),wheezing. Breathing normal pattern . Chest congestion none.   Cardiovascular:   Patient denies chest pain, rheumatic fever,heart murmur.no hx of elevated /hypertension. Leg edema none. Orthopnea no. Palpitations none. PND (paroxsymal nocturnal dyspnea) none.  Exercise physical job  Gastrointestinal:   Patient denies abdominal pain, acid reflux, blood in stool, vomiting, nausea, heartburn denies any wt loss or gain no change in appetite noted no  change in bowel movement noted. Dysphagia none.   Hematology:   Patient denies abnormal bleeding, easy bruising. Enlarged lymph nodes none.   Men Only:   Patient denies difficulty with erection, penile discharge, testicular pain or swelling, urgency/frequency.   Genitourinary:   Patient denies blood in urine, burning on urination, difficulty urinating, dysuria, urinary frequency , urinary incontinence/no history of kidney disease or genital abnormalities. no Dysuria. Nocturia None.   Musculoskeletal:   Patient denies arthritis , back pain, muscle weakness . Joint pain none. Joint stiffness none. .  Also does state that his right middle finger locks up on him-will see ortho  Peripheral Vascular:   General no varicosities, no claudication.   Dermatologic:   Rash none.   Neurologic:   Patient denies dizziness, fainting, headache, loss of consciousness, memory loss, seizures, paresthesias, weakness. Tingling/numbness none. Trouble with balance none.   Psychiatric:   Patient denies anxiety, depression, hallucinations. Insomnia none/  hx of sleep disorder secondary to third shift as well as rotating shift. Memory loss none.         EXAM:   /90   Pulse 94   Temp 98.1 °F (36.7 °C) (Temporal)   Resp 16   Ht 6' 0.5\" (1.842 m)   Wt 217 lb (98.4 kg)   SpO2 99%   BMI 29.03 kg/m²     GENERAL:   Build: average .   HEENT:   Ear canals: normal.   EOM: within normal limits.Pupils BEERTL.Sclera and Conjunctiva normal.   Fundi: normal.   Head: normocephalic.   Nasal septum: midline.   Nose: normal pink mucosa, no congestion, no swelling, no bleeding.   Oral cavity: normal, no lesions seen.   Turbinates: normal.   NECK:   Carotid bruit: none.   Cervical lymph nodes: unremarkable.   JVD: none.   Range of Motion: normal.   Thyroid: unremarkable.   HEART:   Clicks: no.   Edema: none visible .   Heart sounds: normal.   Murmurs: none.   Rhythm: regular.   LUNGS:   Auscultation: clear .   Chest Shape: normal .   Percussion:  normal.   Rales: no .   Respiratory effort: normal .   Rhonchi: no.   Wheezes: no.   ABDOMEN:   Bowel sounds: normal.   General: normal.   Hernia: absent.   Liver, Spleen: no hepatosplenomegaly (HSM).   Tenderness: absent .   GENITOURINARY - MALE:   External genitals: normal scrotum,testis and penis.   CLARITA: normal .   Penis: unremarkable, no penile discharge, no penile lesions.   Testicles: unremarkable, normal-size, without masses, non tender.   EXTREMITIES:   Clubbing: none.   Cyanosis: absent .   Edema: none.   Pulses: present.   Tremors: no.   Varicose veins: absent .   MUSCULOSKELETAL:   Cervical spines: normal.   L-S spines: normal.   Lower extremity joints: normal.   Upper extremity joints: normal.  Patient does have a classical right lateral tennis elbow as well as a right middle finger-trigger  NEUROLOGICAL:   Babinski: negative.All other reflexes are normal.   Cerebellar Testing grossly/intact: yes.Cranial nerves are normal.   Gait: normal.   Motor: Power,tone,co-ordination normalInvoluntary movements and wasting none.   Sensory: all sensory modalities normal.   LYMPHATICS:   Cervical: none.   Groin: no adenopathy .   Inguinal: no adenopathy.   Supraclavicular: none.   DERMATOLOGY:   Rash: no.         ASSESSMENT AND PLAN:   Sukhdeep was seen today for physical.    Diagnoses and all orders for this visit:    Routine general medical examination at a health care facility  -     Gastro Referral - In Network    Hypercholesteremia  -     pravastatin 20 MG Oral Tab; Take 1 tablet (20 mg total) by mouth nightly.  -     Lipid Panel; Future  -     Hepatic Function Panel (7); Future        Pending lab work prior to further recommendations  Patient Instructions   Reinforced diet exercise recheck blood work in 3 months    The patient indicates understanding of these issues and agrees to the plan.  The patient is asked to Return in about 1 year (around 12/26/2025).        Sacha Rodriguez MD

## 2025-01-02 ENCOUNTER — TELEPHONE (OUTPATIENT)
Dept: ORTHOPEDICS CLINIC | Facility: CLINIC | Age: 45
End: 2025-01-02

## 2025-01-27 ENCOUNTER — OFFICE VISIT (OUTPATIENT)
Dept: ORTHOPEDICS CLINIC | Facility: CLINIC | Age: 45
End: 2025-01-27
Payer: COMMERCIAL

## 2025-01-27 VITALS — HEIGHT: 72 IN | BODY MASS INDEX: 29.39 KG/M2 | WEIGHT: 217 LBS

## 2025-01-27 DIAGNOSIS — M65.331 TRIGGER MIDDLE FINGER OF RIGHT HAND: Primary | ICD-10-CM

## 2025-01-27 PROCEDURE — 99214 OFFICE O/P EST MOD 30 MIN: CPT | Performed by: ORTHOPAEDIC SURGERY

## 2025-01-27 NOTE — PROGRESS NOTES
SURGICAL BOOKING SHEET   Name: Sukhdeep Willson  MRN: [id number]   : 1980    Surgical Date:   25   Surgical Consent:   A1 pulley release of right middle finger   Diagnosis:      ICD-10-CM   1. Trigger middle finger of right hand  M65.331       Workers Comp: No   Procedure Codes:   Trigger Finger Release (18940)   Disposition:   Outpatient   Operative Time:   15 mins   Antibiotics:   None   Anesthesia Type:   Local Only   Clearance:    NONE   Equipment:   TFR  OCTR (Local/MAC): [place] Blade, Lead Hand, 4-0 Nylon Suture PS-2, Local Pre-Mix (1% lidocaine w/ epi, 0.5% marcaine, and 8.4% NaBicarb), Bacitracin, Adaptic, 4x4 gauze, 2 inch ace wrap   Assistant:   Mina Assistant: Yes, Rosana Kim PA-C   Follow Up:   10-14 days post op with Rosana   Pain Medication:   OTC   Therapy:   [facility]

## 2025-01-27 NOTE — PROGRESS NOTES
Clinic Note       Assessment/Plan:  44 year old     Triggering of the right middle finger-status post 2 corticosteroid injection with persistent symptoms.  Patient symptoms have worsened and he is interested in proceeding with surgery.  Patient would like to proceed under local anesthesia.  TFR: Patient consented to surgery after having understood the risks associated with surgery, expected outcomes, time to recovery and the possible need for therapy post-operatively. Risks include but not limited to: infection, wound complication, nerve injury resulting in temporary or permanent nerve damage, finger stiffness, persistent pain, swelling, tendon rupture, tendon bowstring, persistent/recurrent triggering, and need for additional surgery.  Patient will need to be off of work at least 2 weeks and then can return to work without restrictions.  Therapy will be coordinated for after surgery.  Sx Feb 25th, 2025    Physical Exam:    Ht 6' (1.829 m)   Wt 217 lb (98.4 kg)   BMI 29.43 kg/m²     Constitutional: NAD. AOx3. Well-developed and Well-nourished.   Psychiatric: Normal mood/ affect/ behavior. Judgment and thought content normal.     Right upper Extremity:   Inspection: Skin Intact. No skin lesions. No gross deformity.   Palpation:  (+) TTP over A1 pulley   Motion: Elbow: normal bilateral symmetric ext/flex  Wrist: normal bilateral symmetric ext/flex/sup/pro  Finger: full composite fist,    Special Tests: (+) Active triggering. (-) PIPJ contracture. Normally sensate digit. Normally perfused digit.       CC: Triggering of right middle finger    HPI: 44 year old  male presents with triggering of right middle. It started 2 years ago. It has failed to improve/resolve. Pain level is moderate. Pain is described as locking. Patient has tried nothing.     (+) pain at A1 Pulley  (+) active triggering    Interval Hx (6/10/2024): Persistent triggering is noted.  The second steroid injection helped a little bit but triggering  never resolved    Interval Hx (1/27/2025): Patient notes worsening of the trigger finger and is interested in proceeding with surgery.    Occupation:  for [employer]    Past Medical History:    Umbilical hernia without obstruction and without gangrene     Past Surgical History:   Procedure Laterality Date    Hernia surgery      Other surgical history      plate in right tibia     Current Outpatient Medications   Medication Sig Dispense Refill    cholecalciferol 50 MCG (2000 UT) Oral Tab Take 1 tablet (2,000 Units total) by mouth daily.      pravastatin 20 MG Oral Tab Take 1 tablet (20 mg total) by mouth nightly. 90 tablet 1     No Known Allergies  Family History   Problem Relation Age of Onset    No Known Problems Mother     No Known Problems Daughter     No Known Problems Son     No Known Problems Sister     No Known Problems Brother      Social History     Occupational History    Not on file   Tobacco Use    Smoking status: Former     Types: Cigarettes    Smokeless tobacco: Never   Vaping Use    Vaping status: Never Used   Substance and Sexual Activity    Alcohol use: Yes     Comment: occ    Drug use: No    Sexual activity: Yes        Review of Systems (negative unless bolded):  General: fevers, chills, fatigue  CV:  chest pain, palpitations, leg swelling  Msk: bodyaches, neck pain, neck stiffness  Skin: rashes, open wounds, nonhealing ulcers  Hem: bleeds easily, bruise easily, immunocompromised  Neuro: dizziness, light headedness, headaches  Psych: anxious, depressed, anger issues    Jey Enriquez MD   Hand, Wrist, & Elbow Surgery  [email or web address]  t: [phone]  f: [phone]

## 2025-02-03 ENCOUNTER — TELEPHONE (OUTPATIENT)
Dept: ORTHOPEDICS CLINIC | Facility: CLINIC | Age: 45
End: 2025-02-03

## 2025-02-03 DIAGNOSIS — M65.331 TRIGGER MIDDLE FINGER OF RIGHT HAND: Primary | ICD-10-CM

## 2025-02-03 NOTE — TELEPHONE ENCOUNTER
Date of Surgery: 2025     Post Op Appt:     Case ID: [id number]    Notes:     SURGICAL BOOKING SHEET   Name: Sukhdeep Willson  MRN: [id number]   : 1980     Surgical Date:    25   Surgical Consent:    A1 pulley release of right middle finger   Diagnosis:         ICD-10-CM   1. Trigger middle finger of right hand  M65.331       Workers Comp: No   Procedure Codes:    Trigger Finger Release (51812)   Disposition:    Outpatient   Operative Time:    15 mins   Antibiotics:    None   Anesthesia Type:    Local Only   Clearance:     NONE   Equipment:    TFR  OCTR (Local/MAC): [place] Blade, Lead Hand, 4-0 Nylon Suture PS-2, Local Pre-Mix (1% lidocaine w/ epi, 0.5% marcaine, and 8.4% NaBicarb), Bacitracin, Adaptic, 4x4 gauze, 2 inch ace wrap   Assistant:    Mina Assistant: Yes, Rosana Kim PA-C   Follow Up:    10-14 days post op with Rosana   Pain Medication:    OTC   Therapy:    [facility]

## 2025-02-17 RX ORDER — CHOLECALCIFEROL (VITAMIN D3) 50 MCG
2000 TABLET ORAL DAILY
Qty: 90 TABLET | Refills: 0 | Status: SHIPPED | OUTPATIENT
Start: 2025-02-17

## 2025-02-17 NOTE — TELEPHONE ENCOUNTER
Name from pharmacy: Vitamin D3 50 Mcg Tab Rug[facility]         Will file in chart as: CHOLECALCIFEROL 50 MCG (2000 UT) Oral Tab    Sig: Take 1 tablet by mouth daily.    Disp: 90 tablet    Refills: 0    Start: 2/15/2025    Class: Normal    Non-formulary    Last ordered: 1 month ago (12/26/2024) by Reported External/Patient    Last refill: 11/19/2024    Rx #: [id number]       To be filled at: [id number] - [place], [place] - [address]  [phone], [phone]

## 2025-02-25 DIAGNOSIS — M65.331 TRIGGER MIDDLE FINGER OF RIGHT HAND: Primary | ICD-10-CM

## 2025-02-27 ENCOUNTER — TELEPHONE (OUTPATIENT)
Dept: ORTHOPEDICS CLINIC | Facility: CLINIC | Age: 45
End: 2025-02-27

## 2025-02-27 NOTE — TELEPHONE ENCOUNTER
Disability forms received via [employer] message on 2/25/25. [employer] message sent for Release of Information. Logged for processing.

## 2025-02-28 ENCOUNTER — PATIENT MESSAGE (OUTPATIENT)
Dept: ORTHOPEDICS CLINIC | Facility: CLINIC | Age: 45
End: 2025-02-28

## 2025-03-01 ENCOUNTER — PATIENT MESSAGE (OUTPATIENT)
Dept: INTERNAL MEDICINE CLINIC | Facility: CLINIC | Age: 45
End: 2025-03-01

## 2025-03-01 DIAGNOSIS — Z00.00 LABORATORY EXAMINATION ORDERED AS PART OF A COMPLETE PHYSICAL EXAMINATION: Primary | ICD-10-CM

## 2025-03-03 ENCOUNTER — TELEPHONE (OUTPATIENT)
Dept: PHYSICAL THERAPY | Facility: HOSPITAL | Age: 45
End: 2025-03-03

## 2025-03-03 DIAGNOSIS — E78.00 HYPERCHOLESTEREMIA: ICD-10-CM

## 2025-03-03 RX ORDER — PRAVASTATIN SODIUM 20 MG
20 TABLET ORAL NIGHTLY
Qty: 90 TABLET | Refills: 1 | Status: SHIPPED | OUTPATIENT
Start: 2025-03-03 | End: 2025-08-30

## 2025-03-04 ENCOUNTER — OFFICE VISIT (OUTPATIENT)
Dept: OCCUPATIONAL MEDICINE | Age: 45
End: 2025-03-04
Attending: PHYSICIAN ASSISTANT
Payer: COMMERCIAL

## 2025-03-04 DIAGNOSIS — M65.331 TRIGGER MIDDLE FINGER OF RIGHT HAND: Primary | ICD-10-CM

## 2025-03-04 PROCEDURE — 97110 THERAPEUTIC EXERCISES: CPT

## 2025-03-04 PROCEDURE — 97165 OT EVAL LOW COMPLEX 30 MIN: CPT

## 2025-03-04 NOTE — PROGRESS NOTES
OT UE EVALUATION:     Diagnosis:   Trigger middle finger of right hand (M65.331) Patient:  Sukhdeep Willson (44 year old, male)        Referring Provider: Rosana Carvajal  Today's Date   3/4/2025    Precautions:  None   Date of Evaluation: 03/04/25  Next MD visit: 3/7/25  Date of Injury: experienced trigger in middle finger for 2 years prior to surgery  Date of Surgery: 2/25/25     PATIENT SUMMARY   Summary of chief complaints: R hand dysfunction  History of current condition: Was experiencing trigger in middle for 2 years prior to surgery. Tried cortisone shots for a while before deciding to complete trigger finger release.   Pain level: current 4 /10, at best 4 /10, at worst 9 /10  Description of symptoms: pain in dorsal and volar side of hand, decreased digit ROM   Occupation: [employer]   Occupational Roles: parent; worker   Leisure activities/Hobbies: works on cars with son   Prior level of function: Independent with ADLs and IADLs  Current limitations: opening jars, using tools for work  Pt goals: functional use of hand, back to work  Hand Dominance: right  Living Situation: family    Past medical history was reviewed with Sukhdeep.  Significant findings include: Right tibia and fibula fracture  Imaging/Tests: MARIELA Tarango  has a past medical history of Umbilical hernia without obstruction and without gangrene (8/11/2019).  He  has a past surgical history that includes other surgical history and hernia surgery.    ASSESSMENT  Sukhdeep presents to occupational therapy evaluation with primary c/o R hand dysfunction. The results of the objective tests and measures show decreased digit CARDENAS,  digit extension of D3, and forearm pronation. Functional deficits include but are not limited to opening jars, using tools for work. Signs and symptoms are consistent with diagnosis of Trigger middle finger of right hand (M65.331). Pt and OT discussed evaluation findings, pathology, POC and HEP.  Pt voiced  understanding and performs HEP correctly without reported pain. Skilled Occupational Therapy is medically necessary to address the above impairments and reach functional goals.  OBJECTIVE:    Musculoskeletal  Observation: Unremarkable Arrived without post op dressing. Stitches are intact. edema present at MCPs and D3. (Stitches to be removed 3/7/25)         ROM and Strength  (* denotes performed with pain)  Elbow   ROM    R L     Pronation 65 85     Supination WNL WNL      Wrist   ROM    R L     Ext (C6) 65 65        R Hand ROM   IF MF RF SF     MP 65 75 70 75     PIP 60 20/60 70 70     DIP 55 60 60 60     CARDENAS 180 195 200 205      L Hand ROM   IF MF RF SF     MP 80 80 75 75     PIP 90 90 95 95     DIP 65 70 65 75     CARDENAS 235 240 235 245      Thumb ROM   MP Flex IP Flex Radial Abd Palmar Abd Opposition     Right WNL WNL WNL WNL Kapandji Score: 10     Left WNL WNL WNL WNL Kapandji Score: 10      Hand Strength (lbs) R L      NT due to acuity of surg NT     2 pt Pinch NT NT     3 pt Pinch NT NT     Lateral pinch NT NT     Edema:  Circum Edema (cm) Wrist Crease MCP     Right 18 cm  22.5 cm      Left 18 cm  22 cm      Rt Hand Edema     RT MF P1 8 cm   RT MF PIP 8 cm   RT MF P2 7 cm   RT MF DIP 6.5 cm   RT MF P3 6 cm   Lt Hand Edema     LT MF P1 7 cm   LT MF PIP 6.5 cm   LT MF P2 6 cm   LT MF DIP 5.5 cm   LT MF P3 5 cm       Neurological:  Sensory: WNL    ADLs/IADLs:  ADL's    Bathing: Ind     Dressing: Ind     Feeding: Ind     Grooming: Ind  IADL's     Homemaking: Ind     Food Prep: minimally limited (cutting food)     Driving: minimally limited (minimal pain present when holding wheel)   Other Functional Mobility/ADL Comments: Ind      Today's Treatment and Response:   Pt education was provided on exam findings, treatment diagnosis, treatment plan, expectations, and prognosis.  Today's Treatment       3/4/2025   OT Treatment   Therapeutic Exercise Return Demo  AROM  Forearm   -Pronation/Supination  Wrist    -Flex/Ext  Digit   -Tendon Glides  -Abduction/Adduction    PROM  D3 passive extension at PIP     Therapeutic Exercise Min 10   Eval Min 30   Total Timed Procedures 10   Total Service Procedures 40   Total Time 40         Patient was instructed in and issued a HEP for:   AROM  Forearm   -Pronation/Supination  Wrist   -Flex/Ext  Digit   -Tendon Glides  -Abduction/Adduction    PROM  D3 passive extension at PIP     Charges:  OT EVAL Low Complexity, 1 TE   Based on analysis of data from a problem-focused assessment from a brief chart review, clinical presentation of physical, cognitive and psychosocial skills, as well as review of patient rated outcome measures, this evaluation involved Low complexity decision making, with 1-3 occupational performance component deficits, no comorbidities, and no need for modification of tasks or assistance with assessment.                                                                                    PLAN OF CARE:    Goals: (to be met in 6 visits)   Pt will demonstrate independence and compliance with HEP to maximize functional outcomes.  Pt will increase digit CARDENAS to be at least 235 in each digit 2-5 for improved use while grasping a cup by d/c  Pt will easily manipulate in hand small objects such as coins, keys or medications by d/c  Pt will demo R  to be at least 60% of L for improved use of tools at work by d/c  Pt will report 1-2/10 pain during cooking tasks by d/c     Frequency / Duration: Patient will be seen 2x/week or a total of 6 visits over a 90 day period. Treatment will include: Therapeutic Activities; Therapeutic Exercise; Home Exercise Program instruction; Manual Therapy    Education or treatment limitation: None   Rehab Potential: good     QuickDASH Outcome Score  Score: (Patient-Rptd) 52.27 % (3/4/2025 10:57 AM)    This treatment was provided by NICOLÁS Goodman, under the direct and constant direction and supervision of a licensed therapist, who provided  consultation regarding skilled judgements, treatment, and assessment of patient care.     Patient/Family/Caregiver was advised of these findings, precautions, and treatment options and has agreed to actively participate in planning and for this course of care.    Thank you for your referral. Please co-sign or sign and return this letter via fax as soon as possible to [phone]. If you have any questions, please contact me at Dept: [phone]    Sincerely,  Electronically signed by therapist: NICOLÁS Goodman  Physician's certification required: Yes  I certify the need for these services furnished under this plan of treatment and while under my care.    X___________________________________________________ Date____________________    Certification From: 3/4/2025  To:6/2/2025

## 2025-03-06 NOTE — TELEPHONE ENCOUNTER
VM: Pt requesting if blood work for testosterone can be ordered with 3 month repeat blood work. OK to order? Pended order, please sign if agree, TY!

## 2025-03-07 ENCOUNTER — OFFICE VISIT (OUTPATIENT)
Dept: ORTHOPEDICS CLINIC | Facility: CLINIC | Age: 45
End: 2025-03-07
Payer: COMMERCIAL

## 2025-03-07 VITALS — WEIGHT: 217 LBS | BODY MASS INDEX: 29.39 KG/M2 | HEIGHT: 72 IN

## 2025-03-07 DIAGNOSIS — M65.331 TRIGGER MIDDLE FINGER OF RIGHT HAND: Primary | ICD-10-CM

## 2025-03-07 PROCEDURE — 99024 POSTOP FOLLOW-UP VISIT: CPT | Performed by: PHYSICIAN ASSISTANT

## 2025-03-07 NOTE — PROGRESS NOTES
Clinic Note       Assessment/Plan:  44 year old     Right middle finger A1 pulley release 2/25/2025-we remove the stitches today.  Patient can progress to heavy activities as tolerated.  Will hold him from work until 3/30/2025.  If he wants to return to work before then he will message us.  He will continue in therapy working on range of motion exercises.  All of his questions were answered.    Physical Exam:    Ht 6' (1.829 m)   Wt 217 lb (98.4 kg)   BMI 29.43 kg/m²     Constitutional: NAD. AOx3. Well-developed and Well-nourished.   Psychiatric: Normal mood/ affect/ behavior. Judgment and thought content normal.     Right upper Extremity:   Inspection: Incision healing well with no signs of infection   Palpation:  (+) TTP over the incision but mild   Motion: Elbow: normal bilateral symmetric ext/flex  Wrist: normal bilateral symmetric ext/flex/sup/pro  Finger: full composite fist, middle finger PIP 10/70 and passively close to full motion           CC: Triggering of right middle finger    HPI: 44 year old  male presents with triggering of right middle. It started 2 years ago. It has failed to improve/resolve. Pain level is moderate. Pain is described as locking. Patient has tried nothing.     Interval Hx: Patient underwent trigger finger release is postop pain is slowly improving    Occupation:  for [employer]    Past Medical History:    Umbilical hernia without obstruction and without gangrene     Past Surgical History:   Procedure Laterality Date    Hernia surgery      Other surgical history      plate in right tibia     Current Outpatient Medications   Medication Sig Dispense Refill    pravastatin 20 MG Oral Tab Take 1 tablet (20 mg total) by mouth nightly. 90 tablet 1    CHOLECALCIFEROL 50 MCG (2000 UT) Oral Tab Take 1 tablet by mouth daily. 90 tablet 0    traMADol 50 MG Oral Tab Take 1 tablet (50 mg total) by mouth every 6 (six) hours as needed for Pain. (Patient not taking: Reported on 3/7/2025) 5  tablet 0     No Known Allergies  Family History   Problem Relation Age of Onset    No Known Problems Mother     No Known Problems Daughter     No Known Problems Son     No Known Problems Sister     No Known Problems Brother      Social History     Occupational History    Not on file   Tobacco Use    Smoking status: Former     Types: Cigarettes    Smokeless tobacco: Never   Vaping Use    Vaping status: Never Used   Substance and Sexual Activity    Alcohol use: Yes     Comment: occ    Drug use: No    Sexual activity: Yes        Review of Systems (negative unless bolded):  General: fevers, chills, fatigue  CV:  chest pain, palpitations, leg swelling  Msk: bodyaches, neck pain, neck stiffness  Skin: rashes, open wounds, nonhealing ulcers  Hem: bleeds easily, bruise easily, immunocompromised  Neuro: dizziness, light headedness, headaches  Psych: anxious, depressed, anger issues  Rosana Carvajal PA-C  Hand, Wrist, & Elbow Surgery  Physician Assistant to Dr. [email or web address]  t: [phone]  f: [phone]

## 2025-03-11 ENCOUNTER — OFFICE VISIT (OUTPATIENT)
Dept: OCCUPATIONAL MEDICINE | Age: 45
End: 2025-03-11
Attending: PHYSICIAN ASSISTANT
Payer: COMMERCIAL

## 2025-03-11 PROCEDURE — 97140 MANUAL THERAPY 1/> REGIONS: CPT

## 2025-03-11 PROCEDURE — 97110 THERAPEUTIC EXERCISES: CPT

## 2025-03-11 PROCEDURE — 97530 THERAPEUTIC ACTIVITIES: CPT

## 2025-03-11 NOTE — PROGRESS NOTES
Patient: Sukhdeep Willson (44 year old, male) Referring Provider:  Insurance:   Diagnosis: Trigger middle finger of right hand (M65.331) Rosana Mayberryjose  ADTZ   Date of Surgery: 2/25/25 Next MD visit:  N/A   Precautions:  None 3/7/25 Referral Information:   Date of Injury: experienced trigger in middle finger for 2 years prior to surgery Date of Evaluation: Req: 0, Auth: 0, Exp:     03/04/25 POC Auth Visits:  6       Today's Date   3/11/2025    Subjective  I have been using my right hand as much as possible. Pain has gotten better.       Pain: 1/10; mild discomfort with gripping activity    Objective  Elbow       3/4/2025 3/11/2025   OT Elbow ROM/MMT   Rt Pronation Elbow 65 75   Lt Pronation Elbow 85    Rt Supination Elbow WNL    Lt Supination Elbow WNL     Rt Hand       3/4/2025 3/11/2025   R Hand ROM/MMT   MP Index Rt 65 75   MP Middle RT 75 90   MP Ring RT 70 90   MP Small RT 75 85   PIP Index RT 60 85   PIP Middle RT 20/60 5/75   PIP Ring RT 70 85   PIP Small RT 70 85   DIP Index RT 55 55   DIP Middle RT 60 70   DIP Ring RT 60 60   DIP Small RT 60 65   CARDENAS Index  215   ACRDENAS Middle  230   CARDENAS Ring  235   CARDENAS Small  235    Lt Hand       3/4/2025   L Hand ROM/MMT   MP Index LT 80   MP Middle LT 80   MP Ring LT 75   MP Small LT 75   PIP Index LT 90   PIP Middle LT 90   PIP Ring LT 95   PIP Small LT 95   DIP Index LT 65   DIP Middle LT 70   DIP Ring LT 65   DIP Small LT 75   CARDENAS Index    CARDENAS Middle    CARDENAS Ring    CARDENAS Small       Assessment  Pt presented with decreased D4 edema. Gains made in D2-5 CARDENAS. Lag in D4 extension has also improved and WFL. Pt demonstrated mild fatigue with repetitive gripping, however pain well-managed throughout session.    Goals (to be met in 6 visits)   Pt will demonstrate independence and compliance with HEP to maximize functional outcomes.  Pt will increase digit CARDENAS to be at least 235 in each digit 2-5 for improved use  while grasping a cup by: Progressing (met with D4-5)   Pt will easily manipulate in hand small objects such as coins, keys or medications by: Progressing    Pt will demo R  to be at least 60% of L for improved use of tools at work by d/c  Pt will report 1-2/10 pain during cooking tasks by : Progressing      Plan  Continue OT for 1-2x/wk for 2-3 more sessions to address dexterity, strengthening, and edema management. Next session to assess POC with pt.    Treatment Last 4 Visits        3/4/2025 3/11/2025   OT Treatment   Treatment Day  2   Therapeutic Exercise Return Demo  AROM  Forearm   -Pronation/Supination  Wrist   -Flex/Ext  Digit   -Tendon Glides  -Abduction/Adduction    PROM  D3 passive extension at PIP   Digit AROM     Digiflex (Black 9 lbs)  -Full  x20  Digiflex (Red 3 lbs)  -Pinch with D3-4/D1 x15  -Isolated digit flexion x15     Therapeutic Activity  Blokus:   in-hand manipulation moving small objects from palm to fingertips.       Manual Therapy  STM to volar side of L hand  Scar Massage  PROM D4 flexion  Retrograde massage to D4   Therapeutic Exercise Min 10 15   Ther Activity Min  10   Manual Therapy Min  12   Eval Min 30    Total Timed Procedures 10 37   Total Service Procedures 40 37   Total Time 40 37         HEP  AROM  Forearm   -Pronation/Supination  Wrist   -Flex/Ext  Digit   -Tendon Glides  -Abduction/Adduction    PROM  D3 passive extension at PIP    No addition to HEP    Charges     1 MT,1 TE, 1 TA    This treatment was provided by NICOLÁS Goodman, under the direct and constant direction and supervision of a licensed therapist, who provided consultation regarding skilled judgements, treatment, and assessment of patient care.

## 2025-03-12 ENCOUNTER — TELEPHONE (OUTPATIENT)
Dept: ORTHOPEDICS CLINIC | Facility: CLINIC | Age: 45
End: 2025-03-12

## 2025-03-13 NOTE — TELEPHONE ENCOUNTER
Patient called details for disability obtained.  [employer] intermediate Board in [employer] already logged. 2nd disability received in Forms dept from [employer], logged for processing.   Will upload completed forms to [employer].      Type of Leave: continuous  Reason for Leave: Trigger middle finger, rt hand  Start date of leave: 2/24/25  End date of leave: 3/29/25  How many flare ups per month/length?:  How many appts per month/length?:   Was Fee and Turnaround info Given?:

## 2025-03-13 NOTE — TELEPHONE ENCOUNTER
Dr. Enriquez/ Rosana,    **3 forms require signature**    Please sign off on form if you agree to: Disability due to Trigger middle finger Rt hand; 2/24/25 - 3/29/25, Return to work 3/30/25, no restrictions    -Signature page will be the first page scanned  -From your Inbasket, Highlight the patient and click Chart   -Double click the 2/27/25 Forms Completion telephone encounter  -Scroll down to the Media section   -Click the blue Hyperlink: Disab 2 Dr Enriquez/RAGINI Carvajal 3/13/25; Disab letter Dr Enriquez/RAGINI Carvajal 3/13/25; Disab 2 Dr Brandy Carvajal 3/13/25  -Click Acknowledge located in the top right ribbon/menu   -Drag the mouse into the blank space of the document and a + sign will appear. Left click to   electronically sign the document.  -Once signed, simply exit out of the screen and you signature will be saved.     Thank you!      Eva

## 2025-03-14 NOTE — TELEPHONE ENCOUNTER
Patient called to check status, patient advised forms sent to provider for signature. Patient expressed understanding, said he will call providers office.

## 2025-03-18 NOTE — TELEPHONE ENCOUNTER
Patient sent Family Medical Leave Act forms via [employer]. [employer] message sent for Release of Information. Logged for processing.

## 2025-03-20 ENCOUNTER — OFFICE VISIT (OUTPATIENT)
Dept: OCCUPATIONAL MEDICINE | Age: 45
End: 2025-03-20
Attending: PHYSICIAN ASSISTANT
Payer: COMMERCIAL

## 2025-03-20 ENCOUNTER — LAB ENCOUNTER (OUTPATIENT)
Dept: LAB | Age: 45
End: 2025-03-20
Attending: INTERNAL MEDICINE
Payer: COMMERCIAL

## 2025-03-20 DIAGNOSIS — E78.00 HYPERCHOLESTEREMIA: ICD-10-CM

## 2025-03-20 DIAGNOSIS — Z00.00 LABORATORY EXAMINATION ORDERED AS PART OF A COMPLETE PHYSICAL EXAMINATION: ICD-10-CM

## 2025-03-20 LAB
ALBUMIN SERPL-MCNC: 5.3 G/DL (ref 3.2–4.8)
ALP LIVER SERPL-CCNC: 61 U/L
ALT SERPL-CCNC: 46 U/L
AST SERPL-CCNC: 33 U/L (ref ?–34)
BILIRUB DIRECT SERPL-MCNC: 0.2 MG/DL (ref ?–0.3)
BILIRUB SERPL-MCNC: 0.8 MG/DL (ref 0.3–1.2)
CHOLEST SERPL-MCNC: 204 MG/DL (ref ?–200)
FASTING PATIENT LIPID ANSWER: YES
HDLC SERPL-MCNC: 60 MG/DL (ref 40–59)
LDLC SERPL CALC-MCNC: 124 MG/DL (ref ?–100)
NONHDLC SERPL-MCNC: 144 MG/DL (ref ?–130)
PROT SERPL-MCNC: 8 G/DL (ref 5.7–8.2)
TRIGL SERPL-MCNC: 113 MG/DL (ref 30–149)
VLDLC SERPL CALC-MCNC: 20 MG/DL (ref 0–30)

## 2025-03-20 PROCEDURE — 97110 THERAPEUTIC EXERCISES: CPT

## 2025-03-20 PROCEDURE — 80076 HEPATIC FUNCTION PANEL: CPT

## 2025-03-20 PROCEDURE — 36415 COLL VENOUS BLD VENIPUNCTURE: CPT

## 2025-03-20 PROCEDURE — 84403 ASSAY OF TOTAL TESTOSTERONE: CPT

## 2025-03-20 PROCEDURE — 84402 ASSAY OF FREE TESTOSTERONE: CPT

## 2025-03-20 PROCEDURE — 80061 LIPID PANEL: CPT

## 2025-03-20 NOTE — PROGRESS NOTES
Patient: Sukhdeep Willson (44 year old, male) Referring Provider:  Insurance:   Diagnosis: Trigger middle finger of right hand (M65.331) Rosana Mariamkaleigh  [facility] [facility]   Date of Surgery: 2/25/25 Next MD visit:  N/A   Precautions:  None 3/7/25 Referral Information:   Date of Injury: experienced trigger in middle finger for 2 years prior to surgery Date of Evaluation: Req: 1, Auth: 1, Exp: 2/25/2026 03/04/25 POC Auth Visits:  Shivani Ahujalefty  Pt has attended 3 visits in Occupational Therapy.  Today's Date   3/20/2025    Subjective  I have little to no pain today. It still feels stiff in the mornings, but it gets better when I start using it. I have been using it for everything mostly.       Pain: 0/10     Objective   Rt Hand       3/4/2025 3/11/2025 3/20/2025   R Hand ROM/MMT   MP Index Rt 65 75 90   MP Middle RT 75 90 90   MP Ring RT 70 90 90   MP Small RT 75 85 90   PIP Index RT 60 85 90   PIP Middle RT 20/60 5/75 0/80   PIP Ring RT 70 85 90   PIP Small RT 70 85 90   DIP Index RT 55 55 55   DIP Middle RT 60 70 70   DIP Ring RT 60 60 65   DIP Small RT 60 65 65   CARDENAS Index  215 235   CARDENAS Middle  230 240   CARDENAS Ring  235 245   CARDENAS Small  235 245    Lt Hand       3/4/2025   L Hand ROM/MMT   MP Index LT 80   MP Middle LT 80   MP Ring LT 75   MP Small LT 75   PIP Index LT 90   PIP Middle LT 90   PIP Ring LT 95   PIP Small LT 95   DIP Index LT 65   DIP Middle LT 70   DIP Ring LT 65   DIP Small LT 75   CARDENAS Index    CARDENAS Middle    CARDENAS Ring    CARDENAS Small     Hand Strength       3/4/2025 3/20/2025   Hand Strength    Rt NT due to acuity of surg 75.5, 89.0, 92.9 AVG 85.7    Lt .7, 116.1, 113.2 .7   2 Pt Pinch Rt NT    2 Pt Pinch Lt NT    3 Pt Pinch Rt NT 23   3 Pt Pinch Lt NT 25   Lateral Pinch Rt NT 20   Lateral Pinch Lt NT 18      Rt Hand Edema      RT MF P1 8 cm   RT MF PIP 7.5cm   RT MF P2 6.5 cm   RT MF DIP 6.0 cm   RT MF P3 5.5 cm   Lt  Hand Edema      LT MF P1 7 cm   LT MF PIP 6.5 cm   LT MF P2 6 cm   LT MF DIP 5.5 cm   LT MF P3 5 cm     Assessment  Pt edema continues to improve. CARDENAS is now WFL compared to opposite side. Pt no longer demos a D3 PIP extension lag.  and pinch strength are also WNL. Mild limitation in R 3-pt pinch strength compared to L side, however is WFL. POC discussed with pt. Pt able to continue strengthening at home.    Goals (to be met in 6 visits)   Pt will demonstrate independence and compliance with HEP to maximize functional outcomes.  Pt will increase digit CARDENAS to be at least 235 in each digit 2-5 for improved use while grasping a cup by: Met and WFL.  Pt will easily manipulate in hand small objects such as coins, keys or medications by: Met    Pt will demo R  to be at least 60% of L for improved use of tools at work by: Met  Pt will report 1-2/10 pain during cooking tasks by: Met     Post QuickDASH Outcome Score  Post Score: (Patient-Rptd) 2.27 % (3/20/2025  2:01 PM)    50 % improvement     Plan  Pt to be discharged to HEP only.      Patient/Family/Caregiver was advised of these findings, precautions, and treatment options and has agreed to actively participate in planning and for this course of care.    Thank you for your referral. If you have any questions, please contact me at Dept: [phone].    This treatment was provided by NICOLÁS Goodman, under the direct and constant direction and supervision of a licensed therapist, who provided consultation regarding skilled judgements, treatment, and assessment of patient care.   Sincerely,  Electronically signed by therapist: NICOLÁS Goodman     Physician's certification required:  No  Please co-sign or sign and return this letter via fax as soon as possible to [phone].   I certify the need for these services furnished under this plan of treatment and while under my care.    X___________________________________________________  Date____________________    Certification From: 3/20/2025  To:6/18/2025      Treatment Last 4 Visits        3/4/2025 3/11/2025 3/20/2025   OT Treatment   Treatment Day  2 3   Therapeutic Exercise Return Demo  AROM  Forearm   -Pronation/Supination  Wrist   -Flex/Ext  Digit   -Tendon Glides  -Abduction/Adduction    PROM  D3 passive extension at PIP   Digit AROM     Digiflex (Black 9 lbs)  -Full  x20  Digiflex (Red 3 lbs)  -Pinch with D3-4/D1 x15  -Isolated digit flexion x15   Putty (yellow)  Gross /reshape  Lateral pinch,  Roll/isolated opposition   Three jaw juan pull  Digit extension    Flex Bar (green)  Wrist flexion x20  Wrist extension x20   Bar bends x20   Reverse Bar bends x20    Clothing Pins (Black)  3 jaw juan pinch to place on board  and take off board      Therapeutic Activity  Blokus:   in-hand manipulation moving small objects from palm to fingertips.        Manual Therapy  STM to volar side of L hand  Scar Massage  PROM D4 flexion  Retrograde massage to D4    Therapeutic Exercise Min 10 15 40   Ther Activity Min  10    Manual Therapy Min  12    Eval Min 30     Total Timed Procedures 10 37 40   Total Service Procedures 40 37 40   Total Time 40 37 40         HEP  Putty (yellow)  Gross /reshape  Lateral pinch,  Roll/isolated opposition   Three jaw juan pull  Digit extension    Charges     3 TE

## 2025-03-21 DIAGNOSIS — E78.00 HYPERCHOLESTEREMIA: Primary | ICD-10-CM

## 2025-03-21 RX ORDER — PRAVASTATIN SODIUM 40 MG
40 TABLET ORAL NIGHTLY
Qty: 90 TABLET | Refills: 1 | Status: SHIPPED | OUTPATIENT
Start: 2025-03-21

## 2025-03-23 ENCOUNTER — PATIENT MESSAGE (OUTPATIENT)
Dept: ORTHOPEDICS CLINIC | Facility: CLINIC | Age: 45
End: 2025-03-23

## 2025-03-25 NOTE — TELEPHONE ENCOUNTER
Patient calling to check status on Family Medical Leave Act form. Advised patient forms are in the process of getting completed. Patient states forms were due on 02/24/25. Patient advised that forms were received on 3/17/25 and that there is 10-15 business days t/a time to complete forms. Advised patient  will be notified and we will do our best to complete them as soon as we can.     Type of Leave: Continuous Family Medical Leave Act   Reason for Leave: Trigger middle finger, rt hand   Start date of leave: 02/24/25  End date of leave: 03/29/25  How many flare ups per month/length?:  How many appts per month/length?:   Was Fee and Turnaround info Given?:

## 2025-03-25 NOTE — TELEPHONE ENCOUNTER
Dr. Enriquez,    Please sign off on form if you agree to: Family Medical Leave Act due to Trigger middle finger Rt hand; 2/24/25 - 3/29/25    -Signature page will be the first page scanned  -From your Inbasket, Highlight the patient and click Chart   -Double click the 2/07/25 Forms Completion telephone encounter  -Scroll down to the Media section   -Click the blue Hyperlink: ROBIN Enriquez 3/25/25  -Click Acknowledge located in the top right ribbon/menu   -Drag the mouse into the blank space of the document and a + sign will appear. Left click to   electronically sign the document.  -Once signed, simply exit out of the screen and you signature will be saved.     Thank you for your time,      Anh

## 2025-03-27 ENCOUNTER — APPOINTMENT (OUTPATIENT)
Dept: OCCUPATIONAL MEDICINE | Age: 45
End: 2025-03-27
Attending: PHYSICIAN ASSISTANT
Payer: COMMERCIAL

## 2025-03-27 LAB
FREE TESTOST DIRECT: 12.6 PG/ML
TESTOSTERONE: 613 NG/DL

## 2025-04-11 ENCOUNTER — OFFICE VISIT (OUTPATIENT)
Dept: ORTHOPEDICS CLINIC | Facility: CLINIC | Age: 45
End: 2025-04-11
Payer: COMMERCIAL

## 2025-04-11 VITALS — BODY MASS INDEX: 29.39 KG/M2 | HEIGHT: 72 IN | WEIGHT: 217 LBS

## 2025-04-11 DIAGNOSIS — M65.331 TRIGGER MIDDLE FINGER OF RIGHT HAND: Primary | ICD-10-CM

## 2025-04-11 PROCEDURE — 99024 POSTOP FOLLOW-UP VISIT: CPT | Performed by: ORTHOPAEDIC SURGERY

## 2025-04-11 NOTE — PROGRESS NOTES
Clinic Note       Assessment/Plan:  44 year old     Right middle finger A1 pulley release 2/25/2025-patient has full flexion but does have some pain at the PIP joint and 5 degree flexion contracture.  Expected to continue improve over the next 6 to 8 weeks.  If it fails to improve significantly steroid injection can be considered    Physical Exam:    Ht 6' (1.829 m)   Wt 217 lb (98.4 kg)   BMI 29.43 kg/m²     Constitutional: NAD. AOx3. Well-developed and Well-nourished.   Psychiatric: Normal mood/ affect/ behavior. Judgment and thought content normal.     Right upper Extremity:   Inspection: Incision healing well with no signs of infection   Palpation:  (+) TTP over the incision but mild   Motion: Elbow: normal bilateral symmetric ext/flex  Wrist: normal bilateral symmetric ext/flex/sup/pro  Finger: full composite fist with slight 5 to 10 degree flexion contracture           CC: Triggering of right middle finger    HPI: 44 year old  male presents with triggering of right middle. It started 2 years ago. It has failed to improve/resolve. Pain level is moderate. Pain is described as locking. Patient has tried nothing.     Interval Hx (4/11/2025): Pain and motion continues to improve.  There is very minimal sensitivity at this point at the incision site.  He does have some pain of the PIP joint.    Occupation:  for [employer]    Past Medical History:    Umbilical hernia without obstruction and without gangrene     Past Surgical History:   Procedure Laterality Date    Hernia surgery      Other surgical history      plate in right tibia     Current Outpatient Medications   Medication Sig Dispense Refill    pravastatin 40 MG Oral Tab Take 1 tablet (40 mg total) by mouth nightly. 90 tablet 1    CHOLECALCIFEROL 50 MCG (2000 UT) Oral Tab Take 1 tablet by mouth daily. 90 tablet 0    traMADol 50 MG Oral Tab Take 1 tablet (50 mg total) by mouth every 6 (six) hours as needed for Pain. (Patient not taking: Reported on  4/11/2025) 5 tablet 0     No Known Allergies  Family History   Problem Relation Age of Onset    No Known Problems Mother     No Known Problems Daughter     No Known Problems Son     No Known Problems Sister     No Known Problems Brother      Social History     Occupational History    Not on file   Tobacco Use    Smoking status: Former     Types: Cigarettes    Smokeless tobacco: Never   Vaping Use    Vaping status: Never Used   Substance and Sexual Activity    Alcohol use: Yes     Comment: occ    Drug use: No    Sexual activity: Yes        Review of Systems (negative unless bolded):  General: fevers, chills, fatigue  CV:  chest pain, palpitations, leg swelling  Msk: bodyaches, neck pain, neck stiffness  Skin: rashes, open wounds, nonhealing ulcers  Hem: bleeds easily, bruise easily, immunocompromised  Neuro: dizziness, light headedness, headaches  Psych: anxious, depressed, anger issues    Jey Enriquez MD   Hand, Wrist, & Elbow Surgery  [email or web address]  t: [phone]  f: [phone]

## 2025-05-19 RX ORDER — CHOLECALCIFEROL (VITAMIN D3) 50 MCG
2000 TABLET ORAL DAILY
Qty: 90 TABLET | Refills: 0 | Status: SHIPPED | OUTPATIENT
Start: 2025-05-19

## (undated) NOTE — Clinical Note
I had the pleasure of seeing Mr. Diane Del Valle in my office today. Please see my attached note.     Elverna Severs

## (undated) NOTE — LETTER
10/4/2019    Return to School / Work    Name: Shruti Bender        : 1980    To Whom It May Concern,    Shruti Bender had surgery on 19 and is:    Able to return to school / work with restrictions:  No lifting over: 20 lbs until

## (undated) NOTE — LETTER
12/10/18        [address] [place] [address],[facility] [id number]      Dear Dennis Gilliland records indicate that you have outstanding lab work and or testing that was ordered for you and has not yet been completed:  Orders Placed This Encounte

## (undated) NOTE — Clinical Note
I had the pleasure of seeing Mr. Payton Renee in my office today. Please see my attached note.     Cierra Lindquist

## (undated) NOTE — LETTER
Date: 3/7/2025    Patient Name: Sukhdeep Willson          To Whom it may concern:    The above patient was seen at [facility] for treatment of a medical condition.    This patient should be excused from attending work until 3/30/25. Patient can return to work on that date with no restrictions    Sincerely,    MARSHALL Duron